# Patient Record
Sex: FEMALE | Race: WHITE | URBAN - NONMETROPOLITAN AREA
[De-identification: names, ages, dates, MRNs, and addresses within clinical notes are randomized per-mention and may not be internally consistent; named-entity substitution may affect disease eponyms.]

---

## 2024-01-08 ENCOUNTER — TELEPHONE (OUTPATIENT)
Dept: FAMILY MEDICINE CLINIC | Age: 59
End: 2024-01-08

## 2024-01-08 NOTE — TELEPHONE ENCOUNTER
Patient had appt scheduled on 12/26 for new patient appt that she missed.  She said she was in the hospital from the 16th to the 29th at [facility] in [place] is why she missed her appt.  She was calling to see if this appt could be rescheduled

## 2024-06-04 LAB
ALPHA-TOCOPHEROL: 6.5 MG/L (ref 9–29)
COPPER: 107 UG/DL (ref 80–158)
FOLATE: 13.5 NG/ML (ref 8.6–58.9)
GAMMA-TOCOPHEROL: 1.1 MG/L (ref 0.5–4.9)
PYRIDOXAL PHOS SERPL-MCNC: 4.8 UG/L (ref 3.4–65.2)
THIAMINE BLOOD: 156.5 NMOL/L (ref 66.5–200)
TSH ULTRASENSITIVE: 3.95 MCIU/ML (ref 0.51–4.91)
VITAMIN B-12: 315 PG/ML (ref 193–986)
VITAMIN D 25-HYDROXY: 22 NG/ML (ref 30–100)

## 2024-07-03 ENCOUNTER — HOSPITAL ENCOUNTER (INPATIENT)
Age: 59
LOS: 19 days | Discharge: HOME | DRG: 884 | End: 2024-07-23
Attending: STUDENT IN AN ORGANIZED HEALTH CARE EDUCATION/TRAINING PROGRAM | Admitting: PSYCHIATRY & NEUROLOGY
Payer: COMMERCIAL

## 2024-07-03 DIAGNOSIS — F03.90 DEMENTIA, UNSPECIFIED DEMENTIA SEVERITY, UNSPECIFIED DEMENTIA TYPE, UNSPECIFIED WHETHER BEHAVIORAL, PSYCHOTIC, OR MOOD DISTURBANCE OR ANXIETY (HCC): ICD-10-CM

## 2024-07-03 DIAGNOSIS — R41.0 CONFUSION: Primary | ICD-10-CM

## 2024-07-03 DIAGNOSIS — R62.7 FAILURE TO THRIVE IN ADULT: ICD-10-CM

## 2024-07-03 LAB
AMPHETAMINES UR QL SCN>1000 NG/ML: ABNORMAL
ANION GAP SERPL CALCULATED.3IONS-SCNC: 7 MMOL/L (ref 3–16)
APAP SERPL-MCNC: <5 UG/ML (ref 10–30)
BARBITURATES UR QL SCN>200 NG/ML: ABNORMAL
BASOPHILS # BLD: 0.1 K/UL (ref 0–0.2)
BASOPHILS NFR BLD: 0.7 %
BENZODIAZ UR QL SCN>200 NG/ML: POSITIVE
BILIRUB UR QL STRIP.AUTO: NEGATIVE
BUN SERPL-MCNC: 11 MG/DL (ref 7–20)
CALCIUM SERPL-MCNC: 9 MG/DL (ref 8.3–10.6)
CANNABINOIDS UR QL SCN>50 NG/ML: ABNORMAL
CHLORIDE SERPL-SCNC: 102 MMOL/L (ref 99–110)
CLARITY UR: CLEAR
CO2 SERPL-SCNC: 31 MMOL/L (ref 21–32)
COCAINE UR QL SCN: ABNORMAL
COLOR UR: NORMAL
CREAT SERPL-MCNC: 0.8 MG/DL (ref 0.6–1.2)
DEPRECATED RDW RBC AUTO: 13.6 % (ref 12.4–15.4)
DRUG SCREEN COMMENT UR-IMP: ABNORMAL
EOSINOPHIL # BLD: 0.3 K/UL (ref 0–0.6)
EOSINOPHIL NFR BLD: 4.2 %
ETHANOLAMINE SERPL-MCNC: NORMAL MG/DL (ref 0–0.08)
FENTANYL SCREEN, URINE: ABNORMAL
GFR SERPLBLD CREATININE-BSD FMLA CKD-EPI: 84 ML/MIN/{1.73_M2}
GLUCOSE SERPL-MCNC: 97 MG/DL (ref 70–99)
GLUCOSE UR STRIP.AUTO-MCNC: NEGATIVE MG/DL
HCT VFR BLD AUTO: 40.9 % (ref 36–48)
HGB BLD-MCNC: 14 G/DL (ref 12–16)
HGB UR QL STRIP.AUTO: NEGATIVE
KETONES UR STRIP.AUTO-MCNC: NEGATIVE MG/DL
LEUKOCYTE ESTERASE UR QL STRIP.AUTO: NEGATIVE
LYMPHOCYTES # BLD: 2.8 K/UL (ref 1–5.1)
LYMPHOCYTES NFR BLD: 36.1 %
MAGNESIUM SERPL-MCNC: 2 MG/DL (ref 1.8–2.4)
MCH RBC QN AUTO: 31.1 PG (ref 26–34)
MCHC RBC AUTO-ENTMCNC: 34.3 G/DL (ref 31–36)
MCV RBC AUTO: 90.8 FL (ref 80–100)
METHADONE UR QL SCN>300 NG/ML: ABNORMAL
MONOCYTES # BLD: 0.5 K/UL (ref 0–1.3)
MONOCYTES NFR BLD: 6.9 %
NEUTROPHILS # BLD: 4 K/UL (ref 1.7–7.7)
NEUTROPHILS NFR BLD: 52.1 %
NITRITE UR QL STRIP.AUTO: NEGATIVE
OPIATES UR QL SCN>300 NG/ML: ABNORMAL
OXYCODONE UR QL SCN: ABNORMAL
PCP UR QL SCN>25 NG/ML: ABNORMAL
PH UR STRIP.AUTO: 7 [PH] (ref 5–8)
PH UR STRIP: 7 [PH]
PLATELET # BLD AUTO: 214 K/UL (ref 135–450)
PMV BLD AUTO: 9.5 FL (ref 5–10.5)
POTASSIUM SERPL-SCNC: 3.4 MMOL/L (ref 3.5–5.1)
PROT UR STRIP.AUTO-MCNC: NEGATIVE MG/DL
RBC # BLD AUTO: 4.5 M/UL (ref 4–5.2)
SALICYLATES SERPL-MCNC: <0.3 MG/DL (ref 15–30)
SODIUM SERPL-SCNC: 140 MMOL/L (ref 136–145)
SP GR UR STRIP.AUTO: <=1.005 (ref 1–1.03)
UA COMPLETE W REFLEX CULTURE PNL UR: NORMAL
UA DIPSTICK W REFLEX MICRO PNL UR: NORMAL
URN SPEC COLLECT METH UR: NORMAL
UROBILINOGEN UR STRIP-ACNC: 0.2 E.U./DL
WBC # BLD AUTO: 7.7 K/UL (ref 4–11)

## 2024-07-03 PROCEDURE — 80179 DRUG ASSAY SALICYLATE: CPT

## 2024-07-03 PROCEDURE — 85025 COMPLETE CBC W/AUTO DIFF WBC: CPT

## 2024-07-03 PROCEDURE — 82306 VITAMIN D 25 HYDROXY: CPT

## 2024-07-03 PROCEDURE — 80307 DRUG TEST PRSMV CHEM ANLYZR: CPT

## 2024-07-03 PROCEDURE — 99285 EMERGENCY DEPT VISIT HI MDM: CPT

## 2024-07-03 PROCEDURE — 83735 ASSAY OF MAGNESIUM: CPT

## 2024-07-03 PROCEDURE — 80048 BASIC METABOLIC PNL TOTAL CA: CPT

## 2024-07-03 PROCEDURE — 81003 URINALYSIS AUTO W/O SCOPE: CPT

## 2024-07-03 PROCEDURE — 36415 COLL VENOUS BLD VENIPUNCTURE: CPT

## 2024-07-03 PROCEDURE — 82077 ASSAY SPEC XCP UR&BREATH IA: CPT

## 2024-07-03 PROCEDURE — 80143 DRUG ASSAY ACETAMINOPHEN: CPT

## 2024-07-03 RX ORDER — SIMVASTATIN 40 MG
40 TABLET ORAL NIGHTLY
COMMUNITY
Start: 2023-06-01

## 2024-07-03 RX ORDER — TIZANIDINE 4 MG/1
TABLET ORAL
COMMUNITY
Start: 2021-11-03

## 2024-07-03 RX ORDER — ROPINIROLE 2 MG/1
4 TABLET, FILM COATED ORAL NIGHTLY
COMMUNITY
Start: 2024-06-19

## 2024-07-03 RX ORDER — DULOXETIN HYDROCHLORIDE 60 MG/1
60 CAPSULE, DELAYED RELEASE ORAL EVERY MORNING
Status: ON HOLD | COMMUNITY
End: 2024-07-23 | Stop reason: HOSPADM

## 2024-07-03 RX ORDER — METOPROLOL SUCCINATE 25 MG/1
50 TABLET, EXTENDED RELEASE ORAL DAILY
COMMUNITY
Start: 2021-10-11

## 2024-07-03 RX ORDER — DONEPEZIL HYDROCHLORIDE 10 MG/1
10 TABLET, FILM COATED ORAL DAILY
COMMUNITY
Start: 2024-06-26

## 2024-07-03 RX ORDER — BENZTROPINE MESYLATE 2 MG/1
4 TABLET ORAL 2 TIMES DAILY
Status: ON HOLD | COMMUNITY
End: 2024-07-23 | Stop reason: HOSPADM

## 2024-07-03 RX ORDER — VENLAFAXINE HYDROCHLORIDE 225 MG/1
1 TABLET, EXTENDED RELEASE ORAL EVERY MORNING
Status: ON HOLD | COMMUNITY
Start: 2024-07-01 | End: 2024-07-23 | Stop reason: HOSPADM

## 2024-07-03 RX ORDER — ALPRAZOLAM 1 MG/1
1 TABLET ORAL 3 TIMES DAILY PRN
COMMUNITY

## 2024-07-03 RX ORDER — ARIPIPRAZOLE 20 MG/1
20 TABLET ORAL
Status: ON HOLD | COMMUNITY
Start: 2024-07-01 | End: 2024-07-23 | Stop reason: HOSPADM

## 2024-07-04 ENCOUNTER — APPOINTMENT (OUTPATIENT)
Dept: CT IMAGING | Age: 59
DRG: 884 | End: 2024-07-04
Payer: COMMERCIAL

## 2024-07-04 PROBLEM — R62.7 FAILURE TO THRIVE IN ADULT: Status: ACTIVE | Noted: 2024-07-04

## 2024-07-04 PROBLEM — G93.40 ACUTE ENCEPHALOPATHY: Status: ACTIVE | Noted: 2024-07-04

## 2024-07-04 PROBLEM — F03.918 DEMENTIA WITH BEHAVIORAL DISTURBANCE (HCC): Status: ACTIVE | Noted: 2024-07-04

## 2024-07-04 PROBLEM — F03.90 DEMENTIA WITHOUT BEHAVIORAL DISTURBANCE (HCC): Status: ACTIVE | Noted: 2024-07-04

## 2024-07-04 PROBLEM — E43 SEVERE PROTEIN-CALORIE MALNUTRITION (HCC): Status: ACTIVE | Noted: 2024-07-04

## 2024-07-04 PROBLEM — R41.0 CONFUSION: Status: ACTIVE | Noted: 2024-07-04

## 2024-07-04 LAB
EKG ATRIAL RATE: 60 BPM
EKG DIAGNOSIS: NORMAL
EKG P AXIS: 83 DEGREES
EKG P-R INTERVAL: 176 MS
EKG Q-T INTERVAL: 446 MS
EKG QRS DURATION: 92 MS
EKG QTC CALCULATION (BAZETT): 446 MS
EKG R AXIS: 78 DEGREES
EKG T AXIS: 79 DEGREES
EKG VENTRICULAR RATE: 60 BPM

## 2024-07-04 PROCEDURE — 93005 ELECTROCARDIOGRAM TRACING: CPT | Performed by: STUDENT IN AN ORGANIZED HEALTH CARE EDUCATION/TRAINING PROGRAM

## 2024-07-04 PROCEDURE — 1240000000 HC EMOTIONAL WELLNESS R&B

## 2024-07-04 PROCEDURE — 70450 CT HEAD/BRAIN W/O DYE: CPT

## 2024-07-04 PROCEDURE — 6370000000 HC RX 637 (ALT 250 FOR IP)

## 2024-07-04 PROCEDURE — 99222 1ST HOSP IP/OBS MODERATE 55: CPT

## 2024-07-04 PROCEDURE — 6370000000 HC RX 637 (ALT 250 FOR IP): Performed by: PSYCHIATRY & NEUROLOGY

## 2024-07-04 PROCEDURE — 93010 ELECTROCARDIOGRAM REPORT: CPT | Performed by: INTERNAL MEDICINE

## 2024-07-04 PROCEDURE — 90792 PSYCH DIAG EVAL W/MED SRVCS: CPT

## 2024-07-04 PROCEDURE — 99223 1ST HOSP IP/OBS HIGH 75: CPT | Performed by: PSYCHIATRY & NEUROLOGY

## 2024-07-04 RX ORDER — BENZTROPINE MESYLATE 1 MG/1
1 TABLET ORAL 2 TIMES DAILY
Status: DISCONTINUED | OUTPATIENT
Start: 2024-07-04 | End: 2024-07-23

## 2024-07-04 RX ORDER — ALBUTEROL SULFATE 90 UG/1
1 AEROSOL, METERED RESPIRATORY (INHALATION) EVERY 4 HOURS PRN
Status: DISCONTINUED | OUTPATIENT
Start: 2024-07-04 | End: 2024-07-23 | Stop reason: HOSPADM

## 2024-07-04 RX ORDER — BENZTROPINE MESYLATE 1 MG/1
4 TABLET ORAL 2 TIMES DAILY
Status: DISCONTINUED | OUTPATIENT
Start: 2024-07-04 | End: 2024-07-04

## 2024-07-04 RX ORDER — OLANZAPINE 5 MG/1
5 TABLET ORAL EVERY 4 HOURS PRN
Status: DISCONTINUED | OUTPATIENT
Start: 2024-07-04 | End: 2024-07-23 | Stop reason: HOSPADM

## 2024-07-04 RX ORDER — MAGNESIUM HYDROXIDE/ALUMINUM HYDROXICE/SIMETHICONE 120; 1200; 1200 MG/30ML; MG/30ML; MG/30ML
30 SUSPENSION ORAL EVERY 6 HOURS PRN
Status: DISCONTINUED | OUTPATIENT
Start: 2024-07-04 | End: 2024-07-23 | Stop reason: HOSPADM

## 2024-07-04 RX ORDER — METOPROLOL SUCCINATE 25 MG/1
25 TABLET, EXTENDED RELEASE ORAL DAILY
Status: DISCONTINUED | OUTPATIENT
Start: 2024-07-05 | End: 2024-07-23 | Stop reason: HOSPADM

## 2024-07-04 RX ORDER — TIZANIDINE 4 MG/1
4 TABLET ORAL EVERY 8 HOURS PRN
Status: DISCONTINUED | OUTPATIENT
Start: 2024-07-04 | End: 2024-07-23 | Stop reason: HOSPADM

## 2024-07-04 RX ORDER — ARIPIPRAZOLE 10 MG/1
20 TABLET ORAL
Status: DISCONTINUED | OUTPATIENT
Start: 2024-07-04 | End: 2024-07-05

## 2024-07-04 RX ORDER — ALBUTEROL SULFATE 90 UG/1
1 AEROSOL, METERED RESPIRATORY (INHALATION) EVERY 4 HOURS PRN
COMMUNITY

## 2024-07-04 RX ORDER — IBUPROFEN 400 MG/1
400 TABLET ORAL EVERY 6 HOURS PRN
Status: DISCONTINUED | OUTPATIENT
Start: 2024-07-04 | End: 2024-07-16

## 2024-07-04 RX ORDER — POLYETHYLENE GLYCOL 3350 17 G
2 POWDER IN PACKET (EA) ORAL
Status: DISCONTINUED | OUTPATIENT
Start: 2024-07-04 | End: 2024-07-23 | Stop reason: HOSPADM

## 2024-07-04 RX ORDER — VENLAFAXINE HYDROCHLORIDE 75 MG/1
225 CAPSULE, EXTENDED RELEASE ORAL EVERY MORNING
Status: DISCONTINUED | OUTPATIENT
Start: 2024-07-04 | End: 2024-07-08

## 2024-07-04 RX ORDER — ACETAMINOPHEN 325 MG/1
650 TABLET ORAL EVERY 4 HOURS PRN
Status: DISCONTINUED | OUTPATIENT
Start: 2024-07-04 | End: 2024-07-16

## 2024-07-04 RX ORDER — ROPINIROLE 1 MG/1
4 TABLET, FILM COATED ORAL NIGHTLY
Status: DISCONTINUED | OUTPATIENT
Start: 2024-07-04 | End: 2024-07-23 | Stop reason: HOSPADM

## 2024-07-04 RX ORDER — ALPRAZOLAM 0.5 MG/1
1 TABLET ORAL 3 TIMES DAILY PRN
Status: DISCONTINUED | OUTPATIENT
Start: 2024-07-04 | End: 2024-07-04

## 2024-07-04 RX ORDER — ATORVASTATIN CALCIUM 10 MG/1
20 TABLET, FILM COATED ORAL DAILY
Status: DISCONTINUED | OUTPATIENT
Start: 2024-07-04 | End: 2024-07-23 | Stop reason: HOSPADM

## 2024-07-04 RX ORDER — BENZTROPINE MESYLATE 1 MG/1
2 TABLET ORAL 2 TIMES DAILY
Status: DISCONTINUED | OUTPATIENT
Start: 2024-07-04 | End: 2024-07-04

## 2024-07-04 RX ORDER — TRAZODONE HYDROCHLORIDE 50 MG/1
50 TABLET ORAL NIGHTLY PRN
Status: DISCONTINUED | OUTPATIENT
Start: 2024-07-04 | End: 2024-07-23 | Stop reason: HOSPADM

## 2024-07-04 RX ORDER — HYDROXYZINE HYDROCHLORIDE 25 MG/1
25 TABLET, FILM COATED ORAL 3 TIMES DAILY PRN
Status: DISCONTINUED | OUTPATIENT
Start: 2024-07-04 | End: 2024-07-23 | Stop reason: HOSPADM

## 2024-07-04 RX ORDER — METOPROLOL SUCCINATE 50 MG/1
50 TABLET, EXTENDED RELEASE ORAL DAILY
Status: DISCONTINUED | OUTPATIENT
Start: 2024-07-04 | End: 2024-07-04

## 2024-07-04 RX ORDER — CEFDINIR 300 MG/1
300 CAPSULE ORAL 2 TIMES DAILY
Status: ON HOLD | COMMUNITY
Start: 2024-06-27 | End: 2024-07-23 | Stop reason: HOSPADM

## 2024-07-04 RX ORDER — ASPIRIN 81 MG/1
81 TABLET, CHEWABLE ORAL DAILY
Status: DISCONTINUED | OUTPATIENT
Start: 2024-07-04 | End: 2024-07-23 | Stop reason: HOSPADM

## 2024-07-04 RX ORDER — HYDROXYZINE 50 MG/1
50 TABLET, FILM COATED ORAL 3 TIMES DAILY PRN
Status: DISCONTINUED | OUTPATIENT
Start: 2024-07-04 | End: 2024-07-04

## 2024-07-04 RX ORDER — ALPRAZOLAM 0.5 MG/1
0.5 TABLET ORAL 3 TIMES DAILY PRN
Status: DISCONTINUED | OUTPATIENT
Start: 2024-07-04 | End: 2024-07-23 | Stop reason: HOSPADM

## 2024-07-04 RX ORDER — DONEPEZIL HYDROCHLORIDE 5 MG/1
10 TABLET, FILM COATED ORAL DAILY
Status: DISCONTINUED | OUTPATIENT
Start: 2024-07-04 | End: 2024-07-23 | Stop reason: HOSPADM

## 2024-07-04 RX ORDER — DIPHENHYDRAMINE HYDROCHLORIDE 50 MG/ML
50 INJECTION INTRAMUSCULAR; INTRAVENOUS EVERY 4 HOURS PRN
Status: DISCONTINUED | OUTPATIENT
Start: 2024-07-04 | End: 2024-07-23 | Stop reason: HOSPADM

## 2024-07-04 RX ADMIN — ROPINIROLE HYDROCHLORIDE 4 MG: 1 TABLET, FILM COATED ORAL at 21:12

## 2024-07-04 RX ADMIN — ALPRAZOLAM 0.5 MG: 0.5 TABLET ORAL at 12:47

## 2024-07-04 RX ADMIN — VENLAFAXINE HYDROCHLORIDE 225 MG: 75 CAPSULE, EXTENDED RELEASE ORAL at 12:41

## 2024-07-04 RX ADMIN — DONEPEZIL HYDROCHLORIDE 10 MG: 5 TABLET, FILM COATED ORAL at 12:42

## 2024-07-04 RX ADMIN — ARIPIPRAZOLE 20 MG: 10 TABLET ORAL at 21:12

## 2024-07-04 RX ADMIN — ASPIRIN 81 MG: 81 TABLET, CHEWABLE ORAL at 16:58

## 2024-07-04 RX ADMIN — ACETAMINOPHEN 650 MG: 325 TABLET ORAL at 09:54

## 2024-07-04 RX ADMIN — METOPROLOL SUCCINATE 50 MG: 50 TABLET, EXTENDED RELEASE ORAL at 12:42

## 2024-07-04 RX ADMIN — BENZTROPINE MESYLATE 1 MG: 1 TABLET ORAL at 21:15

## 2024-07-04 RX ADMIN — ACETAMINOPHEN 650 MG: 325 TABLET ORAL at 21:13

## 2024-07-04 RX ADMIN — ATORVASTATIN CALCIUM 20 MG: 10 TABLET, FILM COATED ORAL at 12:42

## 2024-07-04 RX ADMIN — BENZTROPINE MESYLATE 1 MG: 1 TABLET ORAL at 12:43

## 2024-07-04 NOTE — BH NOTE
Clinician called [facility] to report the concerns about the daughter/and man with her dropping the patient off did not stay to help answer questions. ER staff attempted to call both daughter and her sister and so did this clinician today. None of the numbers are working and unable to leave a message or get collateral. Clinician left her contact information requesting a call back in order to report the concerns the hospital staff have.

## 2024-07-04 NOTE — BH NOTE
Clinician attempted to contact Angela (Daughter) [phone]:\"number has been disconnected\"  Clinician attempted to contact Rita (Daughter) [phone]: \"the party you are trying to call is not available\"  Clinician attempted to contact Laly (Sister) [phone]: \"the party you are trying to call is not available\"

## 2024-07-04 NOTE — H&P
Hospital Medicine History & Physical      PCP: Gamaliel Villeda MD    Date of Admission: 7/3/2024    Date of Service: Pt seen/examined on 7/4/2024     Chief Complaint:    Chief Complaint   Patient presents with    Dementia     Patient reported to have dementia. Over the last two weeks patient is not safe being home. Reported to have drank , dog warmers and cooking food on stove without a pan. Patient needs placement.          History Of Present Illness:      The patient is a 59 y.o. female with pmhx of anxiety, bipolar disorder,CAD,dementia, HTN who presented to [facility] for inability to care for self, increasing confusion.  Patient was seen and evaluated in the ED by the ED medical provider, patient was medically cleared for admission to [facility].  This note serves as an admission medical H&P.    Tobacco use: 0.5 ppd   ETOH use: None   Illicit drug use: Marijuana     Patient denies any medical complaints     Past Medical History:        Diagnosis Date    Anxiety     Bipolar 1 disorder (HCC)     CAD (coronary artery disease)     Dementia ([facility])     Hypertension     NSTEMI (non-ST elevated myocardial infarction) ([facility])        Past Surgical History:        Procedure Laterality Date    CHOLECYSTECTOMY      HYSTERECTOMY (CERVIX STATUS UNKNOWN)         Medications Prior to Admission:    Prior to Admission medications    Medication Sig Start Date End Date Taking? Authorizing Provider   albuterol sulfate HFA (VENTOLIN HFA) 108 (90 Base) MCG/ACT inhaler Inhale 1 puff into the lungs every 4 hours as needed for Wheezing or Shortness of Breath   Yes Paz Bernabe MD   cefdinir (OMNICEF) 300 MG capsule Take 1 capsule by mouth 2 times daily 6/27/24 7/4/24 Yes Paz Bernabe MD   venlafaxine 225 MG extended release tablet Take 1 tablet by mouth every morning 7/1/24  Yes Paz Bernabe MD   metoprolol succinate (TOPROL XL) 25 MG extended release tablet Take 2 tablets by mouth daily 10/11/21  Yes 
MD   1 mg at 07/04/24 1243    ALPRAZolam (XANAX) tablet 0.5 mg  0.5 mg Oral TID PRN Eben Hebert MD   0.5 mg at 07/04/24 1247    hydrOXYzine HCl (ATARAX) tablet 25 mg  25 mg Oral TID PRN Eben Hebert MD          donepezil  10 mg Oral Daily    metoprolol succinate  50 mg Oral Daily    rOPINIRole  4 mg Oral Nightly    atorvastatin  20 mg Oral Daily    venlafaxine  225 mg Oral QAM    ARIPiprazole  20 mg Oral QHS    benztropine  1 mg Oral BID      PRN Meds: acetaminophen, ibuprofen, magnesium hydroxide, nicotine polacrilex, aluminum & magnesium hydroxide-simethicone, OLANZapine **OR** OLANZapine (ZyPREXA) 10 mg in sterile water 2 mL injection, diphenhydrAMINE, traZODone, albuterol sulfate HFA, tiZANidine, ALPRAZolam, hydrOXYzine HCl   Estimated length of stay: 5 d  Prognosis:  poor   Criteria for Discharge:    Not suicidal, sleeping well, affect stable, depression improving, eating well, aftercare arranged.     Spent > 75 minutes evaluating and treating patient with more than 50 % of time spent with patient discussing care    ______  PLAN    1.  Admit to Adult Behavioral Unit / Senior Behavioral Unit  2.  Consult Internal Medicine to evaluate and treat medical conditions  3.  Adjust psychotropic medications to target symptoms, Reduce Cogentin to 1 mg BID, Xanan to 0.5 mg TID prn, DC Cymbalta.   Continue Effexor  mg QD, Abilify 20 mg QD, Aricept 10 mg QD.   4.  Occupational Therapy, Physical Therapy, Group Psychotherapy as tolerated . MOCA   5.  Reviewed treatment plan with patient including medication risks, benefits, side effects.  Obtained informed consent for treatment.     Eben Hebert MD  Physician Psychiatry

## 2024-07-04 NOTE — ED PROVIDER NOTES
[facility] ED     EMERGENCY DEPARTMENT ENCOUNTER         Pt Name: Carol Wagoner   MRN: [id number]   Birthdate 1965   Date of evaluation: 7/3/2024   Provider: Joshua Mancuso MD   PCP: Gamaliel Villeda MD   Note Started: 10:40 PM EDT 7/3/24       Chief Complaint     Dementia (Patient reported to have dementia. Over the last two weeks patient is not safe being home. Reported to have drank , dog warmers and cooking food on stove without a pan. Patient needs placement. )      History of Present Illness     Carol Wagoner is a 59 y.o. female who presents with concern for cognitive decline unable to care for self at home.  The patient reportedly was dropped off by her daughter with whom she lives.  I was unable to interview the daughter for collateral or contact her via telephone but per nursing triage the patient is increasingly unsafe at home drinking some type of dog medication and also trying to cook on the stove in an unsafe manner.  The patient reportedly is increasingly confused and unable to attend to activities of daily living despite assistance from family and therefore she is brought to the emergency department.  On my evaluation the patient is alert she has no acute complaints but is quite tearful regarding her condition and states that she feels confused.  She offers no other acute complaints and has difficulty contributing a history of present illness.      I have reviewed the nursing notes and agree unless otherwise noted.    Review of Systems     Positives and pertinent negatives as per HPI.    Past Medical, Surgical, Family, and Social History     She has a past medical history of Anxiety, Bipolar 1 disorder ([facility]), CAD (coronary artery disease), Dementia ([facility]), Hypertension, and NSTEMI (non-ST elevated myocardial infarction) ([facility]).  She has a past surgical history that includes Cholecystectomy and Hysterectomy.  Her family history is not on file.  She reports that

## 2024-07-04 NOTE — CARE COORDINATION
Patient was cooperative answering the questions.    Tere Wilburn, MSW    07/04/24 1101   Activities of Daily Living   Patient Requires assistance with daily self-care activities? Yes   Patient identified needing assistance with: Food Preparation;Grocery Shopping;Transportation;Housecleaning;Laundry;Meal Planning   Details Reported patient has not been able to do her  ADL's   Person Assisting Other   Person Assisting details lives with son   Leisure Activity 1   3 Favorite Leisure Activities \"Nothing seriously\"   Leisure Activity 2   Favorite Leisure Activities  \"Nothing seriously\"   Leisure Activity 3   Favorite Leisure Activities  \"Nothing seriously\"   Social   Patient reports spending the majority of their free time alone   Patient verbalizes a preference for spending free time with one other person   Patient’s perception of support system negative/weak   Patient’s perception of barriers to socializing with others include(s) lack of motivation/interest   Beliefs & Coping   Has difficulty dealing with feelings   Yes   Internalizes feelings/Keeps feelings in Yes   Externalizes feelings through aggressiveness or poor temper control  Yes   Feels uncomfortable around others  Yes   Has difficulty talking to others  Yes   Depends on others for direction or decisions Yes   Difficulty dealing with anger of others  Yes   Difficulty dealing with own anger  Yes   Difficulty managing stress Yes   Frequently has difficulty with relationships  Yes   which,who,where in family   Has recently perceived/experienced loss, disappointment, humiliation or failure  Yes   General perception about self does not like self   Attitude about abilities feels like a failure much of the time   Locus of Control  rarely   Belief about recovery it's not up to me   Patient Identified Strengths  \"I dont know\"   Patient Identified Limitations  \"I dont know\"   Perception of most stressful event prior to hospitalization \"I dont remember\"   Perception of

## 2024-07-04 NOTE — FLOWSHEET NOTE
24 0115   Vital Signs   Temp 98.6 °F (37 °C)   Temp Source Oral   Pulse 61   Heart Rate Source Monitor   Respirations 18   BP (!) 141/80   MAP (Calculated) 100   BP Location Left upper arm   Pain Assessment   Pain Assessment None - Denies Pain   Opioid-Induced Sedation   POSS Score 1   Oxygen Therapy   SpO2 98 %   O2 Device None (Room air)     Pt moved from to ED  room 19. Report received from RN. Pt confused to time/place/situation. Oriented to name/.  Pt given pepsi and pudding per her request. No admission orders yet. Per report, psych consult pending. Bed alarm on due to pt's hx confusion.  Call light given to pt. Instructed her to press call light if she needs to get OOB to the restroom. Pt verbalized understanding. Shirley Iqbal RN

## 2024-07-04 NOTE — VIRTUAL HEALTH
resolved hospital problems. *       OBJECTIVE  Vital Signs:  Vitals:    24 0115   BP: (!) 141/80   Pulse: 61   Resp: 18   Temp: 98.6 °F (37 °C)   SpO2: 98%     Labs:  Reviewed. UDS: benzodiazepines  ETOH: None detected   HCG: Unknown  EK24  -QTc: 446  Recent Results (from the past 48 hour(s))   Urinalysis with Reflex to Culture    Collection Time: 24 10:28 PM    Specimen: Urine   Result Value Ref Range    Color, UA Straw Straw/Yellow    Clarity, UA Clear Clear    Glucose, Ur Negative Negative mg/dL    Bilirubin, Urine Negative Negative    Ketones, Urine Negative Negative mg/dL    Specific Gravity, UA <=1.005 1.005 - 1.030    Blood, Urine Negative Negative    pH, Urine 7.0 5.0 - 8.0    Protein, UA Negative Negative mg/dL    Urobilinogen, Urine 0.2 <2.0 E.U./dL    Nitrite, Urine Negative Negative    Leukocyte Esterase, Urine Negative Negative    Microscopic Examination Not Indicated     Urine Type NotGiven     Urine Reflex to Culture Not Indicated    CBC with Auto Differential    Collection Time: 24 10:28 PM   Result Value Ref Range    WBC 7.7 4.0 - 11.0 K/uL    RBC 4.50 4.00 - 5.20 M/uL    Hemoglobin 14.0 12.0 - 16.0 g/dL    Hematocrit 40.9 36.0 - 48.0 %    MCV 90.8 80.0 - 100.0 fL    MCH 31.1 26.0 - 34.0 pg    MCHC 34.3 31.0 - 36.0 g/dL    RDW 13.6 12.4 - 15.4 %    Platelets 214 135 - 450 K/uL    MPV 9.5 5.0 - 10.5 fL    Neutrophils % 52.1 %    Lymphocytes % 36.1 %    Monocytes % 6.9 %    Eosinophils % 4.2 %    Basophils % 0.7 %    Neutrophils Absolute 4.0 1.7 - 7.7 K/uL    Lymphocytes Absolute 2.8 1.0 - 5.1 K/uL    Monocytes Absolute 0.5 0.0 - 1.3 K/uL    Eosinophils Absolute 0.3 0.0 - 0.6 K/uL    Basophils Absolute 0.1 0.0 - 0.2 K/uL   BMP w/ Reflex to MG    Collection Time: 24 10:28 PM   Result Value Ref Range    Sodium 140 136 - 145 mmol/L    Potassium reflex Magnesium 3.4 (L) 3.5 - 5.1 mmol/L    Chloride 102 99 - 110 mmol/L    CO2 31 21 - 32 mmol/L    Anion Gap 7 3 - 16

## 2024-07-05 LAB — 25(OH)D3 SERPL-MCNC: 24.3 NG/ML

## 2024-07-05 PROCEDURE — 1240000000 HC EMOTIONAL WELLNESS R&B

## 2024-07-05 PROCEDURE — 99233 SBSQ HOSP IP/OBS HIGH 50: CPT | Performed by: PSYCHIATRY & NEUROLOGY

## 2024-07-05 PROCEDURE — 6370000000 HC RX 637 (ALT 250 FOR IP): Performed by: PSYCHIATRY & NEUROLOGY

## 2024-07-05 PROCEDURE — 6370000000 HC RX 637 (ALT 250 FOR IP)

## 2024-07-05 RX ORDER — ARIPIPRAZOLE 10 MG/1
10 TABLET ORAL
Status: DISCONTINUED | OUTPATIENT
Start: 2024-07-05 | End: 2024-07-08

## 2024-07-05 RX ORDER — ERGOCALCIFEROL 1.25 MG/1
50000 CAPSULE ORAL WEEKLY
Status: DISCONTINUED | OUTPATIENT
Start: 2024-07-05 | End: 2024-07-23 | Stop reason: HOSPADM

## 2024-07-05 RX ADMIN — HYDROXYZINE HYDROCHLORIDE 25 MG: 25 TABLET ORAL at 20:02

## 2024-07-05 RX ADMIN — TIZANIDINE 4 MG: 4 TABLET ORAL at 20:18

## 2024-07-05 RX ADMIN — ALPRAZOLAM 0.5 MG: 0.5 TABLET ORAL at 19:16

## 2024-07-05 RX ADMIN — OLANZAPINE 5 MG: 5 TABLET, FILM COATED ORAL at 20:25

## 2024-07-05 RX ADMIN — DONEPEZIL HYDROCHLORIDE 10 MG: 5 TABLET, FILM COATED ORAL at 09:08

## 2024-07-05 RX ADMIN — BENZTROPINE MESYLATE 1 MG: 1 TABLET ORAL at 20:02

## 2024-07-05 RX ADMIN — VENLAFAXINE HYDROCHLORIDE 225 MG: 75 CAPSULE, EXTENDED RELEASE ORAL at 09:06

## 2024-07-05 RX ADMIN — ATORVASTATIN CALCIUM 20 MG: 10 TABLET, FILM COATED ORAL at 09:07

## 2024-07-05 RX ADMIN — BENZTROPINE MESYLATE 1 MG: 1 TABLET ORAL at 09:06

## 2024-07-05 RX ADMIN — ACETAMINOPHEN 650 MG: 325 TABLET ORAL at 09:08

## 2024-07-05 RX ADMIN — ASPIRIN 81 MG: 81 TABLET, CHEWABLE ORAL at 09:06

## 2024-07-05 RX ADMIN — METOPROLOL SUCCINATE 25 MG: 25 TABLET, EXTENDED RELEASE ORAL at 09:09

## 2024-07-05 RX ADMIN — ERGOCALCIFEROL 50000 UNITS: 1.25 CAPSULE ORAL at 11:16

## 2024-07-05 RX ADMIN — NICOTINE POLACRILEX 2 MG: 2 LOZENGE ORAL at 19:24

## 2024-07-05 RX ADMIN — ROPINIROLE HYDROCHLORIDE 4 MG: 1 TABLET, FILM COATED ORAL at 20:02

## 2024-07-05 RX ADMIN — ARIPIPRAZOLE 10 MG: 10 TABLET ORAL at 20:02

## 2024-07-06 PROCEDURE — 6370000000 HC RX 637 (ALT 250 FOR IP): Performed by: PSYCHIATRY & NEUROLOGY

## 2024-07-06 PROCEDURE — 97165 OT EVAL LOW COMPLEX 30 MIN: CPT

## 2024-07-06 PROCEDURE — 1240000000 HC EMOTIONAL WELLNESS R&B

## 2024-07-06 PROCEDURE — 6370000000 HC RX 637 (ALT 250 FOR IP)

## 2024-07-06 PROCEDURE — 97530 THERAPEUTIC ACTIVITIES: CPT

## 2024-07-06 RX ORDER — NICOTINE 21 MG/24HR
1 PATCH, TRANSDERMAL 24 HOURS TRANSDERMAL DAILY
Status: DISCONTINUED | OUTPATIENT
Start: 2024-07-06 | End: 2024-07-23 | Stop reason: HOSPADM

## 2024-07-06 RX ORDER — OLANZAPINE 5 MG/1
5 TABLET ORAL ONCE
Status: COMPLETED | OUTPATIENT
Start: 2024-07-06 | End: 2024-07-06

## 2024-07-06 RX ADMIN — DONEPEZIL HYDROCHLORIDE 10 MG: 5 TABLET, FILM COATED ORAL at 08:15

## 2024-07-06 RX ADMIN — ROPINIROLE HYDROCHLORIDE 4 MG: 1 TABLET, FILM COATED ORAL at 20:59

## 2024-07-06 RX ADMIN — ATORVASTATIN CALCIUM 20 MG: 10 TABLET, FILM COATED ORAL at 08:15

## 2024-07-06 RX ADMIN — TIZANIDINE 4 MG: 4 TABLET ORAL at 23:36

## 2024-07-06 RX ADMIN — METOPROLOL SUCCINATE 25 MG: 25 TABLET, EXTENDED RELEASE ORAL at 08:15

## 2024-07-06 RX ADMIN — HYDROXYZINE HYDROCHLORIDE 25 MG: 25 TABLET ORAL at 15:29

## 2024-07-06 RX ADMIN — ALPRAZOLAM 0.5 MG: 0.5 TABLET ORAL at 18:46

## 2024-07-06 RX ADMIN — BENZTROPINE MESYLATE 1 MG: 1 TABLET ORAL at 08:15

## 2024-07-06 RX ADMIN — ARIPIPRAZOLE 10 MG: 10 TABLET ORAL at 20:59

## 2024-07-06 RX ADMIN — VENLAFAXINE HYDROCHLORIDE 225 MG: 75 CAPSULE, EXTENDED RELEASE ORAL at 08:14

## 2024-07-06 RX ADMIN — IBUPROFEN 400 MG: 400 TABLET, FILM COATED ORAL at 09:24

## 2024-07-06 RX ADMIN — OLANZAPINE 5 MG: 5 TABLET, FILM COATED ORAL at 20:59

## 2024-07-06 RX ADMIN — ALPRAZOLAM 0.5 MG: 0.5 TABLET ORAL at 08:14

## 2024-07-06 RX ADMIN — OLANZAPINE 5 MG: 5 TABLET, FILM COATED ORAL at 23:06

## 2024-07-06 RX ADMIN — ASPIRIN 81 MG: 81 TABLET, CHEWABLE ORAL at 08:15

## 2024-07-06 RX ADMIN — ACETAMINOPHEN 650 MG: 325 TABLET ORAL at 08:14

## 2024-07-06 RX ADMIN — BENZTROPINE MESYLATE 1 MG: 1 TABLET ORAL at 20:59

## 2024-07-06 RX ADMIN — TRAZODONE HYDROCHLORIDE 50 MG: 50 TABLET ORAL at 23:36

## 2024-07-06 RX ADMIN — TIZANIDINE 4 MG: 4 TABLET ORAL at 09:24

## 2024-07-07 PROCEDURE — 6370000000 HC RX 637 (ALT 250 FOR IP)

## 2024-07-07 PROCEDURE — 99233 SBSQ HOSP IP/OBS HIGH 50: CPT | Performed by: PSYCHIATRY & NEUROLOGY

## 2024-07-07 PROCEDURE — 1240000000 HC EMOTIONAL WELLNESS R&B

## 2024-07-07 PROCEDURE — 6370000000 HC RX 637 (ALT 250 FOR IP): Performed by: PSYCHIATRY & NEUROLOGY

## 2024-07-07 RX ORDER — LORAZEPAM 1 MG/1
1 TABLET ORAL ONCE
Status: COMPLETED | OUTPATIENT
Start: 2024-07-07 | End: 2024-07-07

## 2024-07-07 RX ADMIN — VENLAFAXINE HYDROCHLORIDE 225 MG: 75 CAPSULE, EXTENDED RELEASE ORAL at 08:49

## 2024-07-07 RX ADMIN — DONEPEZIL HYDROCHLORIDE 10 MG: 5 TABLET, FILM COATED ORAL at 08:51

## 2024-07-07 RX ADMIN — HYDROXYZINE HYDROCHLORIDE 25 MG: 25 TABLET ORAL at 20:31

## 2024-07-07 RX ADMIN — BENZTROPINE MESYLATE 1 MG: 1 TABLET ORAL at 20:31

## 2024-07-07 RX ADMIN — ROPINIROLE HYDROCHLORIDE 4 MG: 1 TABLET, FILM COATED ORAL at 20:31

## 2024-07-07 RX ADMIN — METOPROLOL SUCCINATE 25 MG: 25 TABLET, EXTENDED RELEASE ORAL at 08:51

## 2024-07-07 RX ADMIN — ALPRAZOLAM 0.5 MG: 0.5 TABLET ORAL at 17:31

## 2024-07-07 RX ADMIN — BENZTROPINE MESYLATE 1 MG: 1 TABLET ORAL at 08:49

## 2024-07-07 RX ADMIN — ARIPIPRAZOLE 10 MG: 10 TABLET ORAL at 20:31

## 2024-07-07 RX ADMIN — LORAZEPAM 1 MG: 1 TABLET ORAL at 01:51

## 2024-07-07 RX ADMIN — ALPRAZOLAM 0.5 MG: 0.5 TABLET ORAL at 08:49

## 2024-07-07 RX ADMIN — HYDROXYZINE HYDROCHLORIDE 25 MG: 25 TABLET ORAL at 13:19

## 2024-07-07 RX ADMIN — ATORVASTATIN CALCIUM 20 MG: 10 TABLET, FILM COATED ORAL at 08:49

## 2024-07-07 RX ADMIN — TIZANIDINE 4 MG: 4 TABLET ORAL at 20:31

## 2024-07-07 RX ADMIN — TRAZODONE HYDROCHLORIDE 50 MG: 50 TABLET ORAL at 20:31

## 2024-07-07 RX ADMIN — IBUPROFEN 400 MG: 400 TABLET, FILM COATED ORAL at 20:31

## 2024-07-07 RX ADMIN — ASPIRIN 81 MG: 81 TABLET, CHEWABLE ORAL at 08:49

## 2024-07-07 NOTE — BH NOTE
Daughter Angela called for update. Told her patient was becoming anxious this morning regarding visual hallucinations of babies in her room and patient had a PRN Xanax.

## 2024-07-08 PROCEDURE — 99233 SBSQ HOSP IP/OBS HIGH 50: CPT | Performed by: PSYCHIATRY & NEUROLOGY

## 2024-07-08 PROCEDURE — 6370000000 HC RX 637 (ALT 250 FOR IP)

## 2024-07-08 PROCEDURE — 1240000000 HC EMOTIONAL WELLNESS R&B

## 2024-07-08 PROCEDURE — 6370000000 HC RX 637 (ALT 250 FOR IP): Performed by: PSYCHIATRY & NEUROLOGY

## 2024-07-08 RX ORDER — PERPHENAZINE 4 MG/1
2 TABLET ORAL NIGHTLY
Status: DISCONTINUED | OUTPATIENT
Start: 2024-07-08 | End: 2024-07-12

## 2024-07-08 RX ORDER — ARIPIPRAZOLE 5 MG/1
5 TABLET ORAL
Status: DISCONTINUED | OUTPATIENT
Start: 2024-07-08 | End: 2024-07-23

## 2024-07-08 RX ORDER — VENLAFAXINE HYDROCHLORIDE 75 MG/1
150 CAPSULE, EXTENDED RELEASE ORAL EVERY MORNING
Status: DISCONTINUED | OUTPATIENT
Start: 2024-07-09 | End: 2024-07-23 | Stop reason: HOSPADM

## 2024-07-08 RX ADMIN — IBUPROFEN 400 MG: 400 TABLET, FILM COATED ORAL at 22:41

## 2024-07-08 RX ADMIN — ARIPIPRAZOLE 5 MG: 5 TABLET ORAL at 20:32

## 2024-07-08 RX ADMIN — ATORVASTATIN CALCIUM 20 MG: 10 TABLET, FILM COATED ORAL at 10:08

## 2024-07-08 RX ADMIN — ALPRAZOLAM 0.5 MG: 0.5 TABLET ORAL at 22:42

## 2024-07-08 RX ADMIN — TRAZODONE HYDROCHLORIDE 50 MG: 50 TABLET ORAL at 22:42

## 2024-07-08 RX ADMIN — BENZTROPINE MESYLATE 1 MG: 1 TABLET ORAL at 10:08

## 2024-07-08 RX ADMIN — ROPINIROLE HYDROCHLORIDE 4 MG: 1 TABLET, FILM COATED ORAL at 20:32

## 2024-07-08 RX ADMIN — METOPROLOL SUCCINATE 25 MG: 25 TABLET, EXTENDED RELEASE ORAL at 10:08

## 2024-07-08 RX ADMIN — ASPIRIN 81 MG: 81 TABLET, CHEWABLE ORAL at 10:08

## 2024-07-08 RX ADMIN — BENZTROPINE MESYLATE 1 MG: 1 TABLET ORAL at 20:32

## 2024-07-08 RX ADMIN — DONEPEZIL HYDROCHLORIDE 10 MG: 5 TABLET, FILM COATED ORAL at 10:08

## 2024-07-08 RX ADMIN — VENLAFAXINE HYDROCHLORIDE 225 MG: 75 CAPSULE, EXTENDED RELEASE ORAL at 10:08

## 2024-07-08 RX ADMIN — HYDROXYZINE HYDROCHLORIDE 25 MG: 25 TABLET ORAL at 13:53

## 2024-07-08 RX ADMIN — PERPHENAZINE 2 MG: 4 TABLET, FILM COATED ORAL at 20:32

## 2024-07-08 RX ADMIN — ALPRAZOLAM 0.5 MG: 0.5 TABLET ORAL at 10:50

## 2024-07-08 NOTE — BH NOTE
[facility]  Treatment Team Note  Review Date & Time: 7/8/24  1210    Patient was not in treatment team      Status EXAM:   Mental Status and Behavioral Exam  Normal: No  Level of Assistance: Independent/Self  Facial Expression: Worried  Affect: Unstable  Level of Consciousness: Confused  Frequency of Checks: 4 times per hour, close  Mood:Normal: No  Mood: Depressed, Anxious, Sad  Motor Activity:Normal: Yes  Motor Activity: Increased  Eye Contact: Good  Observed Behavior: Cooperative, Preoccupied, Tearful  Sexual Misconduct History: Current - no  Preception: [place] to person  Attention:Normal: No  Attention: Distractible, Unable to concentrate  Thought Processes: Perseveration  Thought Content:Normal: No  Thought Content: Delusions, Preoccupations  Depression Symptoms: Change in energy level, Impaired concentration, Loss of interest  Anxiety Symptoms: Generalized  Martha Symptoms: Poor judgment  Hallucinations: Auditory (comment), Visual (comment)  Delusions: Yes  Delusions: Other (comment) (Believes her baby is in bed with her)  Memory:Normal: No  Memory: Poor recent, Poor remote  Insight and Judgment: No  Insight and Judgment: Poor judgment, Poor insight      Suicide Risk CSSR-S:  1) Within the past month, have you wished you were dead or wished you could go to sleep and not wake up? : Yes  2) Have you actually had any thoughts of killing yourself? : No  6) Have you ever done anything, started to do anything, or prepared to do anything to end your life?: No      PLAN/TREATMENT RECOMMENDATIONS UPDATE: Patient will take medication as prescribed, eat 75% of meals, attend groups, participate in milieu activities, participate in treatment team and care planning for discharge and follow up.           Suellen Diaz RN

## 2024-07-08 NOTE — BH NOTE
2031-PRN-Zanaflex 4mg ,Advil 400 mg, Atarax 25mg and Trazodone 50 mg given For anxiety, pain,and sleep.

## 2024-07-08 NOTE — BH NOTE
Writer spoke to pt's daughter Angela [phone] to discuss pt's cognitive status and placement needs. Daughter shared that she believes that it is time for her mother to go to placement due to her needs and her level of impairment which is supported by pt's MoCA scoring and recommendations from both her attending and her OP providers. Daughter shared that she would like for pt to be placed in either [facility] or [facility] to keep her close to home. She was aware that [facility] has a locked memory care unit, but did not know about [facility], her first choice.    Writer will complete PASRR application with [facility]'s information and have that submitted for placement.    Alfred Young MSW, LSW

## 2024-07-08 NOTE — BH NOTE
Carol is anxious and crying. She is upset that her baby has no where to go. \"And look at her, she is always just smiling.\" Pt is looking a wadded blankets in her bed. Xanax admin for anxiety and crying. Will monitor effect.

## 2024-07-09 PROCEDURE — 1240000000 HC EMOTIONAL WELLNESS R&B

## 2024-07-09 PROCEDURE — 6370000000 HC RX 637 (ALT 250 FOR IP): Performed by: PSYCHIATRY & NEUROLOGY

## 2024-07-09 PROCEDURE — 99233 SBSQ HOSP IP/OBS HIGH 50: CPT | Performed by: PSYCHIATRY & NEUROLOGY

## 2024-07-09 PROCEDURE — 6370000000 HC RX 637 (ALT 250 FOR IP)

## 2024-07-09 RX ADMIN — BENZTROPINE MESYLATE 1 MG: 1 TABLET ORAL at 20:06

## 2024-07-09 RX ADMIN — PERPHENAZINE 2 MG: 4 TABLET, FILM COATED ORAL at 20:06

## 2024-07-09 RX ADMIN — METOPROLOL SUCCINATE 25 MG: 25 TABLET, EXTENDED RELEASE ORAL at 08:38

## 2024-07-09 RX ADMIN — VENLAFAXINE HYDROCHLORIDE 150 MG: 75 CAPSULE, EXTENDED RELEASE ORAL at 08:38

## 2024-07-09 RX ADMIN — ATORVASTATIN CALCIUM 20 MG: 10 TABLET, FILM COATED ORAL at 08:38

## 2024-07-09 RX ADMIN — TRAZODONE HYDROCHLORIDE 50 MG: 50 TABLET ORAL at 21:59

## 2024-07-09 RX ADMIN — BENZTROPINE MESYLATE 1 MG: 1 TABLET ORAL at 08:38

## 2024-07-09 RX ADMIN — ALPRAZOLAM 0.5 MG: 0.5 TABLET ORAL at 19:29

## 2024-07-09 RX ADMIN — ACETAMINOPHEN 650 MG: 325 TABLET ORAL at 09:25

## 2024-07-09 RX ADMIN — ASPIRIN 81 MG: 81 TABLET, CHEWABLE ORAL at 08:38

## 2024-07-09 RX ADMIN — ROPINIROLE HYDROCHLORIDE 4 MG: 1 TABLET, FILM COATED ORAL at 20:06

## 2024-07-09 RX ADMIN — HYDROXYZINE HYDROCHLORIDE 25 MG: 25 TABLET ORAL at 19:29

## 2024-07-09 RX ADMIN — ARIPIPRAZOLE 5 MG: 5 TABLET ORAL at 20:06

## 2024-07-09 RX ADMIN — DONEPEZIL HYDROCHLORIDE 10 MG: 5 TABLET, FILM COATED ORAL at 08:38

## 2024-07-09 RX ADMIN — IBUPROFEN 400 MG: 400 TABLET, FILM COATED ORAL at 21:59

## 2024-07-09 NOTE — BH NOTE
Pharmacy list updated. Refills sent as requested but switched from class of normal to print when signed. Medications called to [facility] at [facility] and printed scripts faxed. Desirae Arellano MA     Writer provided physician with Expert Evaluation paperwork to be completed for pt's daughter to apply for guardianship. Writer will provide to daughter once completed.    PASRR submitted and pt ruled out due to dementia diagnosis. Writer left message for the Admissions team with [facility] ([facility]) for call back to discuss admission and transfer.    Alfred Young MSW, LSW

## 2024-07-10 PROCEDURE — 6370000000 HC RX 637 (ALT 250 FOR IP)

## 2024-07-10 PROCEDURE — 6370000000 HC RX 637 (ALT 250 FOR IP): Performed by: PSYCHIATRY & NEUROLOGY

## 2024-07-10 PROCEDURE — 1240000000 HC EMOTIONAL WELLNESS R&B

## 2024-07-10 RX ADMIN — TRAZODONE HYDROCHLORIDE 50 MG: 50 TABLET ORAL at 20:10

## 2024-07-10 RX ADMIN — ARIPIPRAZOLE 5 MG: 5 TABLET ORAL at 20:09

## 2024-07-10 RX ADMIN — HYDROXYZINE HYDROCHLORIDE 25 MG: 25 TABLET ORAL at 14:08

## 2024-07-10 RX ADMIN — ROPINIROLE HYDROCHLORIDE 4 MG: 1 TABLET, FILM COATED ORAL at 20:09

## 2024-07-10 RX ADMIN — VENLAFAXINE HYDROCHLORIDE 150 MG: 75 CAPSULE, EXTENDED RELEASE ORAL at 08:12

## 2024-07-10 RX ADMIN — HYDROXYZINE HYDROCHLORIDE 25 MG: 25 TABLET ORAL at 22:50

## 2024-07-10 RX ADMIN — ATORVASTATIN CALCIUM 20 MG: 10 TABLET, FILM COATED ORAL at 08:12

## 2024-07-10 RX ADMIN — TIZANIDINE 4 MG: 4 TABLET ORAL at 14:08

## 2024-07-10 RX ADMIN — PERPHENAZINE 2 MG: 4 TABLET, FILM COATED ORAL at 20:09

## 2024-07-10 RX ADMIN — BENZTROPINE MESYLATE 1 MG: 1 TABLET ORAL at 20:10

## 2024-07-10 RX ADMIN — IBUPROFEN 400 MG: 400 TABLET, FILM COATED ORAL at 20:10

## 2024-07-10 RX ADMIN — ASPIRIN 81 MG: 81 TABLET, CHEWABLE ORAL at 08:12

## 2024-07-10 RX ADMIN — BENZTROPINE MESYLATE 1 MG: 1 TABLET ORAL at 08:12

## 2024-07-10 RX ADMIN — METOPROLOL SUCCINATE 25 MG: 25 TABLET, EXTENDED RELEASE ORAL at 08:12

## 2024-07-10 RX ADMIN — DONEPEZIL HYDROCHLORIDE 10 MG: 5 TABLET, FILM COATED ORAL at 08:12

## 2024-07-10 RX ADMIN — OLANZAPINE 5 MG: 5 TABLET, FILM COATED ORAL at 21:41

## 2024-07-10 RX ADMIN — ALPRAZOLAM 0.5 MG: 0.5 TABLET ORAL at 20:10

## 2024-07-10 NOTE — BH NOTE
Writer informed daughter that physician will have Expert Evaluation completed in the morning for her to . She stated that herself or designee will arrive tomorrow to get the forms which will be in a sealed envelope at the nurses station.    Writer and daughter discussed next steps of the placement process and daughter will be assisting with reaching out to both [facility] and [facility]. She will provide them with writer's contact information for collaboration on admission.    Alfred Young MSW, LSW

## 2024-07-10 NOTE — BH NOTE
Call received from daughter, reporting that mom was upset.followed up with patient. Patient c/o anxiety requesting Atarax and Zanaflex given for muscle spasms.

## 2024-07-11 LAB
ANION GAP SERPL CALCULATED.3IONS-SCNC: 8 MMOL/L (ref 3–16)
BASOPHILS # BLD: 0 K/UL (ref 0–0.2)
BASOPHILS NFR BLD: 0.3 %
BUN SERPL-MCNC: 27 MG/DL (ref 7–20)
CALCIUM SERPL-MCNC: 9.1 MG/DL (ref 8.3–10.6)
CHLORIDE SERPL-SCNC: 100 MMOL/L (ref 99–110)
CO2 SERPL-SCNC: 31 MMOL/L (ref 21–32)
CREAT SERPL-MCNC: 0.8 MG/DL (ref 0.6–1.2)
DEPRECATED RDW RBC AUTO: 13.9 % (ref 12.4–15.4)
EOSINOPHIL # BLD: 0.5 K/UL (ref 0–0.6)
EOSINOPHIL NFR BLD: 6.1 %
GFR SERPLBLD CREATININE-BSD FMLA CKD-EPI: 84 ML/MIN/{1.73_M2}
GLUCOSE SERPL-MCNC: 84 MG/DL (ref 70–99)
HCT VFR BLD AUTO: 40.3 % (ref 36–48)
HGB BLD-MCNC: 13.5 G/DL (ref 12–16)
LYMPHOCYTES # BLD: 1.9 K/UL (ref 1–5.1)
LYMPHOCYTES NFR BLD: 23.4 %
MCH RBC QN AUTO: 31.2 PG (ref 26–34)
MCHC RBC AUTO-ENTMCNC: 33.6 G/DL (ref 31–36)
MCV RBC AUTO: 92.8 FL (ref 80–100)
MONOCYTES # BLD: 0.4 K/UL (ref 0–1.3)
MONOCYTES NFR BLD: 5.4 %
NEUTROPHILS # BLD: 5.3 K/UL (ref 1.7–7.7)
NEUTROPHILS NFR BLD: 64.8 %
PLATELET # BLD AUTO: 198 K/UL (ref 135–450)
PMV BLD AUTO: 10.4 FL (ref 5–10.5)
POTASSIUM SERPL-SCNC: 4 MMOL/L (ref 3.5–5.1)
RBC # BLD AUTO: 4.34 M/UL (ref 4–5.2)
SODIUM SERPL-SCNC: 139 MMOL/L (ref 136–145)
WBC # BLD AUTO: 8.1 K/UL (ref 4–11)

## 2024-07-11 PROCEDURE — 6370000000 HC RX 637 (ALT 250 FOR IP)

## 2024-07-11 PROCEDURE — 80048 BASIC METABOLIC PNL TOTAL CA: CPT

## 2024-07-11 PROCEDURE — 99233 SBSQ HOSP IP/OBS HIGH 50: CPT | Performed by: PSYCHIATRY & NEUROLOGY

## 2024-07-11 PROCEDURE — 1240000000 HC EMOTIONAL WELLNESS R&B

## 2024-07-11 PROCEDURE — 85025 COMPLETE CBC W/AUTO DIFF WBC: CPT

## 2024-07-11 PROCEDURE — 36415 COLL VENOUS BLD VENIPUNCTURE: CPT

## 2024-07-11 PROCEDURE — 6370000000 HC RX 637 (ALT 250 FOR IP): Performed by: PSYCHIATRY & NEUROLOGY

## 2024-07-11 RX ADMIN — HYDROXYZINE HYDROCHLORIDE 25 MG: 25 TABLET ORAL at 23:27

## 2024-07-11 RX ADMIN — PERPHENAZINE 2 MG: 4 TABLET, FILM COATED ORAL at 19:42

## 2024-07-11 RX ADMIN — METOPROLOL SUCCINATE 25 MG: 25 TABLET, EXTENDED RELEASE ORAL at 07:48

## 2024-07-11 RX ADMIN — ASPIRIN 81 MG: 81 TABLET, CHEWABLE ORAL at 07:48

## 2024-07-11 RX ADMIN — HYDROXYZINE HYDROCHLORIDE 25 MG: 25 TABLET ORAL at 16:01

## 2024-07-11 RX ADMIN — BENZTROPINE MESYLATE 1 MG: 1 TABLET ORAL at 19:43

## 2024-07-11 RX ADMIN — ARIPIPRAZOLE 5 MG: 5 TABLET ORAL at 19:42

## 2024-07-11 RX ADMIN — DONEPEZIL HYDROCHLORIDE 10 MG: 5 TABLET, FILM COATED ORAL at 07:49

## 2024-07-11 RX ADMIN — TIZANIDINE 4 MG: 4 TABLET ORAL at 23:27

## 2024-07-11 RX ADMIN — BENZTROPINE MESYLATE 1 MG: 1 TABLET ORAL at 07:48

## 2024-07-11 RX ADMIN — ATORVASTATIN CALCIUM 20 MG: 10 TABLET, FILM COATED ORAL at 07:49

## 2024-07-11 RX ADMIN — OLANZAPINE 5 MG: 5 TABLET, FILM COATED ORAL at 19:43

## 2024-07-11 RX ADMIN — VENLAFAXINE HYDROCHLORIDE 150 MG: 75 CAPSULE, EXTENDED RELEASE ORAL at 07:48

## 2024-07-11 RX ADMIN — TRAZODONE HYDROCHLORIDE 50 MG: 50 TABLET ORAL at 19:42

## 2024-07-11 RX ADMIN — ALPRAZOLAM 0.5 MG: 0.5 TABLET ORAL at 19:02

## 2024-07-11 RX ADMIN — ROPINIROLE HYDROCHLORIDE 4 MG: 1 TABLET, FILM COATED ORAL at 19:42

## 2024-07-11 RX ADMIN — ALPRAZOLAM 0.5 MG: 0.5 TABLET ORAL at 08:03

## 2024-07-11 RX ADMIN — OLANZAPINE 5 MG: 5 TABLET, FILM COATED ORAL at 08:55

## 2024-07-11 NOTE — BH NOTE
2010 PRN Ibuprofen 400 mg given to help with muscle spasms to neck and Prn Trazodone 50 mg given to aide with sleep. Xanax  0.5 for anxiety.

## 2024-07-11 NOTE — BH NOTE
2250 Prn Atarax 25 mg given for anxiety Patient pacing around unit and emotionally upset that we have her dog. Patient had multiple blankets wrapped up like baby blankets and convinced there was babys inside them women had left. Blankets removed from room and patient helped get to bed.

## 2024-07-11 NOTE — BH NOTE
Writer spoke to pt's daughter to confirm that the Expert Evaluation paperwork was ready for her. She or her friend will be arriving today around lunchtime to get it. She stated that she was unable to reach out to the facilities yesterday and writer validated that it is a lot of work to manage placement. Writer will be sending referral packets to both facilities today for consideration of placement.    Alfred Young MSW, LSW

## 2024-07-12 PROCEDURE — 1240000000 HC EMOTIONAL WELLNESS R&B

## 2024-07-12 PROCEDURE — 99233 SBSQ HOSP IP/OBS HIGH 50: CPT | Performed by: PSYCHIATRY & NEUROLOGY

## 2024-07-12 PROCEDURE — 6370000000 HC RX 637 (ALT 250 FOR IP)

## 2024-07-12 PROCEDURE — 6370000000 HC RX 637 (ALT 250 FOR IP): Performed by: PSYCHIATRY & NEUROLOGY

## 2024-07-12 RX ORDER — LOPERAMIDE HYDROCHLORIDE 2 MG/1
2 CAPSULE ORAL EVERY 6 HOURS PRN
Status: DISCONTINUED | OUTPATIENT
Start: 2024-07-12 | End: 2024-07-23 | Stop reason: HOSPADM

## 2024-07-12 RX ORDER — PERPHENAZINE 4 MG/1
4 TABLET ORAL NIGHTLY
Status: DISCONTINUED | OUTPATIENT
Start: 2024-07-12 | End: 2024-07-23

## 2024-07-12 RX ADMIN — PERPHENAZINE 4 MG: 4 TABLET, FILM COATED ORAL at 20:52

## 2024-07-12 RX ADMIN — ERGOCALCIFEROL 50000 UNITS: 1.25 CAPSULE ORAL at 09:02

## 2024-07-12 RX ADMIN — TRAZODONE HYDROCHLORIDE 50 MG: 50 TABLET ORAL at 20:52

## 2024-07-12 RX ADMIN — ALPRAZOLAM 0.5 MG: 0.5 TABLET ORAL at 18:23

## 2024-07-12 RX ADMIN — ROPINIROLE HYDROCHLORIDE 4 MG: 1 TABLET, FILM COATED ORAL at 20:51

## 2024-07-12 RX ADMIN — VENLAFAXINE HYDROCHLORIDE 150 MG: 75 CAPSULE, EXTENDED RELEASE ORAL at 09:03

## 2024-07-12 RX ADMIN — ALPRAZOLAM 0.5 MG: 0.5 TABLET ORAL at 09:03

## 2024-07-12 RX ADMIN — BENZTROPINE MESYLATE 1 MG: 1 TABLET ORAL at 09:02

## 2024-07-12 RX ADMIN — BENZTROPINE MESYLATE 1 MG: 1 TABLET ORAL at 20:51

## 2024-07-12 RX ADMIN — HYDROXYZINE HYDROCHLORIDE 25 MG: 25 TABLET ORAL at 12:28

## 2024-07-12 RX ADMIN — LOPERAMIDE HYDROCHLORIDE 2 MG: 2 CAPSULE ORAL at 20:51

## 2024-07-12 RX ADMIN — DONEPEZIL HYDROCHLORIDE 10 MG: 5 TABLET, FILM COATED ORAL at 09:02

## 2024-07-12 RX ADMIN — HYDROXYZINE HYDROCHLORIDE 25 MG: 25 TABLET ORAL at 23:26

## 2024-07-12 RX ADMIN — ASPIRIN 81 MG: 81 TABLET, CHEWABLE ORAL at 09:03

## 2024-07-12 RX ADMIN — ARIPIPRAZOLE 5 MG: 5 TABLET ORAL at 20:51

## 2024-07-12 RX ADMIN — TIZANIDINE 4 MG: 4 TABLET ORAL at 23:26

## 2024-07-12 RX ADMIN — ATORVASTATIN CALCIUM 20 MG: 10 TABLET, FILM COATED ORAL at 09:03

## 2024-07-12 NOTE — BH NOTE
PRN Zanaflex 4 mg PO administered for c/o muscle spasm to lower back 6/10.    PRN Atarax 25 mg PO administered for anxiety and restlessness patient pacing the unit.    0055 PRN Zanaflex 4 mg PO and Atarax 25 mg PO both effective patient sleeping at this time.

## 2024-07-12 NOTE — BH NOTE
Clinical referral faxed to [facility] [phone] and [facility] [phone].    Writer spoke with Darya in Admissions with [facility] [phone] who will review and call writer with any questions and determination.    Writer attempted to reach Admissions with [facility] but no one was available and there was no VM to leave a message.    Update: Darya called back to share that [facility] is unable to take pt due to their lack of aminata with [facility] due to new management.    Writer also sent clinical packet to Petey Morfin, Admission Director for [facility] [email or web address], for consideration of placement on their secured unit.    Alfred Young MSW, LSW

## 2024-07-13 PROCEDURE — 6370000000 HC RX 637 (ALT 250 FOR IP)

## 2024-07-13 PROCEDURE — 6370000000 HC RX 637 (ALT 250 FOR IP): Performed by: PSYCHIATRY & NEUROLOGY

## 2024-07-13 PROCEDURE — 1240000000 HC EMOTIONAL WELLNESS R&B

## 2024-07-13 PROCEDURE — 99233 SBSQ HOSP IP/OBS HIGH 50: CPT | Performed by: PSYCHIATRY & NEUROLOGY

## 2024-07-13 RX ADMIN — BENZTROPINE MESYLATE 1 MG: 1 TABLET ORAL at 20:42

## 2024-07-13 RX ADMIN — IBUPROFEN 400 MG: 400 TABLET, FILM COATED ORAL at 08:44

## 2024-07-13 RX ADMIN — ASPIRIN 81 MG: 81 TABLET, CHEWABLE ORAL at 08:44

## 2024-07-13 RX ADMIN — VENLAFAXINE HYDROCHLORIDE 150 MG: 75 CAPSULE, EXTENDED RELEASE ORAL at 08:44

## 2024-07-13 RX ADMIN — TRAZODONE HYDROCHLORIDE 50 MG: 50 TABLET ORAL at 22:49

## 2024-07-13 RX ADMIN — ATORVASTATIN CALCIUM 20 MG: 10 TABLET, FILM COATED ORAL at 08:43

## 2024-07-13 RX ADMIN — METOPROLOL SUCCINATE 25 MG: 25 TABLET, EXTENDED RELEASE ORAL at 08:44

## 2024-07-13 RX ADMIN — DONEPEZIL HYDROCHLORIDE 10 MG: 5 TABLET, FILM COATED ORAL at 08:45

## 2024-07-13 RX ADMIN — ARIPIPRAZOLE 5 MG: 5 TABLET ORAL at 20:42

## 2024-07-13 RX ADMIN — ALPRAZOLAM 0.5 MG: 0.5 TABLET ORAL at 20:42

## 2024-07-13 RX ADMIN — ALPRAZOLAM 0.5 MG: 0.5 TABLET ORAL at 08:44

## 2024-07-13 RX ADMIN — LOPERAMIDE HYDROCHLORIDE 2 MG: 2 CAPSULE ORAL at 08:45

## 2024-07-13 RX ADMIN — PERPHENAZINE 4 MG: 4 TABLET, FILM COATED ORAL at 20:42

## 2024-07-13 RX ADMIN — IBUPROFEN 400 MG: 400 TABLET, FILM COATED ORAL at 20:42

## 2024-07-13 RX ADMIN — BENZTROPINE MESYLATE 1 MG: 1 TABLET ORAL at 08:43

## 2024-07-13 RX ADMIN — ROPINIROLE HYDROCHLORIDE 4 MG: 1 TABLET, FILM COATED ORAL at 20:42

## 2024-07-13 NOTE — BH NOTE
Patient c/o anxiety and restlessness and muscle spasm to lower back 7/10.   PRN Zanaflex 4 mg PO administered for muscle spasm.  PRN Atarax 25 mg PO administered for anxiety and restlessness.     0130 PRN Zanaflex 4 mg PO for muscle spasm and PRN Atarax 25 mg PO for anxiety and restlessness effective patient sleeping.

## 2024-07-13 NOTE — BH NOTE
PRN Trazodone 50 mg PO administered for sleep.  PRN Imodium 2 mg PO administered for diarrhea.    2315 PRN Trazodone 50 mg PO for sleep ineffective.  PRN Imodium 2 mg PO for diarrhea effective.

## 2024-07-13 NOTE — BH NOTE
Patient c/o severe diarrhea, New order Imodium 2 mg PO every 6 hours PRN for diarrhea per Dr. Hebert.

## 2024-07-13 NOTE — BH NOTE
Patient alert to person place and time, anxious and restless pacing unit for several hours. Independent and continent with steady gait. Patient endorsed auditory hallucinations but unable to describe what the voices are saying, also endorses visual hallucinations stating \" There are a hundred babies in my room with my dog.\" Denies any thoughts of self harm or homicidal ideations. Will monitor for safety and offer support as needed.

## 2024-07-14 PROCEDURE — 6370000000 HC RX 637 (ALT 250 FOR IP): Performed by: PSYCHIATRY & NEUROLOGY

## 2024-07-14 PROCEDURE — 6370000000 HC RX 637 (ALT 250 FOR IP)

## 2024-07-14 PROCEDURE — 1240000000 HC EMOTIONAL WELLNESS R&B

## 2024-07-14 RX ADMIN — ATORVASTATIN CALCIUM 20 MG: 10 TABLET, FILM COATED ORAL at 09:09

## 2024-07-14 RX ADMIN — DONEPEZIL HYDROCHLORIDE 10 MG: 5 TABLET, FILM COATED ORAL at 09:11

## 2024-07-14 RX ADMIN — ROPINIROLE HYDROCHLORIDE 4 MG: 1 TABLET, FILM COATED ORAL at 21:00

## 2024-07-14 RX ADMIN — BENZTROPINE MESYLATE 1 MG: 1 TABLET ORAL at 21:00

## 2024-07-14 RX ADMIN — TRAZODONE HYDROCHLORIDE 50 MG: 50 TABLET ORAL at 21:00

## 2024-07-14 RX ADMIN — ALPRAZOLAM 0.5 MG: 0.5 TABLET ORAL at 20:59

## 2024-07-14 RX ADMIN — PERPHENAZINE 4 MG: 4 TABLET, FILM COATED ORAL at 21:00

## 2024-07-14 RX ADMIN — VENLAFAXINE HYDROCHLORIDE 150 MG: 75 CAPSULE, EXTENDED RELEASE ORAL at 09:09

## 2024-07-14 RX ADMIN — ARIPIPRAZOLE 5 MG: 5 TABLET ORAL at 21:00

## 2024-07-14 RX ADMIN — BENZTROPINE MESYLATE 1 MG: 1 TABLET ORAL at 09:11

## 2024-07-14 RX ADMIN — ASPIRIN 81 MG: 81 TABLET, CHEWABLE ORAL at 09:09

## 2024-07-14 RX ADMIN — IBUPROFEN 400 MG: 400 TABLET, FILM COATED ORAL at 21:00

## 2024-07-15 PROCEDURE — 6370000000 HC RX 637 (ALT 250 FOR IP)

## 2024-07-15 PROCEDURE — 6370000000 HC RX 637 (ALT 250 FOR IP): Performed by: PSYCHIATRY & NEUROLOGY

## 2024-07-15 PROCEDURE — 1240000000 HC EMOTIONAL WELLNESS R&B

## 2024-07-15 PROCEDURE — 99233 SBSQ HOSP IP/OBS HIGH 50: CPT | Performed by: PSYCHIATRY & NEUROLOGY

## 2024-07-15 RX ADMIN — BENZTROPINE MESYLATE 1 MG: 1 TABLET ORAL at 09:04

## 2024-07-15 RX ADMIN — PERPHENAZINE 4 MG: 4 TABLET, FILM COATED ORAL at 20:25

## 2024-07-15 RX ADMIN — BENZTROPINE MESYLATE 1 MG: 1 TABLET ORAL at 20:25

## 2024-07-15 RX ADMIN — HYDROXYZINE HYDROCHLORIDE 25 MG: 25 TABLET ORAL at 12:58

## 2024-07-15 RX ADMIN — IBUPROFEN 400 MG: 400 TABLET, FILM COATED ORAL at 20:26

## 2024-07-15 RX ADMIN — VENLAFAXINE HYDROCHLORIDE 150 MG: 75 CAPSULE, EXTENDED RELEASE ORAL at 09:04

## 2024-07-15 RX ADMIN — TRAZODONE HYDROCHLORIDE 50 MG: 50 TABLET ORAL at 20:25

## 2024-07-15 RX ADMIN — ASPIRIN 81 MG: 81 TABLET, CHEWABLE ORAL at 09:04

## 2024-07-15 RX ADMIN — ARIPIPRAZOLE 5 MG: 5 TABLET ORAL at 20:25

## 2024-07-15 RX ADMIN — TIZANIDINE 4 MG: 4 TABLET ORAL at 09:03

## 2024-07-15 RX ADMIN — ALPRAZOLAM 0.5 MG: 0.5 TABLET ORAL at 20:25

## 2024-07-15 RX ADMIN — ROPINIROLE HYDROCHLORIDE 4 MG: 1 TABLET, FILM COATED ORAL at 20:26

## 2024-07-15 RX ADMIN — DONEPEZIL HYDROCHLORIDE 10 MG: 5 TABLET, FILM COATED ORAL at 09:05

## 2024-07-15 RX ADMIN — ATORVASTATIN CALCIUM 20 MG: 10 TABLET, FILM COATED ORAL at 09:04

## 2024-07-15 NOTE — GROUP NOTE
Group Therapy Note    Date: 7/15/2024    Group Start Time: 1315  Group End Time: 1400  Group Topic: Recreational    [facility]    Maria Guadalupe Quinn        Group Therapy Note    Attendees: 5    Group members played 15 seconds of a theme song from the 70's and had to guess what show the song belonged to. The goal of group was to evoke memories, stimulate mental activity, promote social interaction, and improve well-being.     Notes:  Patient attended group for the majority of the time. Patient remained engaged and interacted appropriately with other members of the group.     Status After Intervention:  Improved    Participation Level: Active Listener and Interactive    Participation Quality: Appropriate, Attentive, and Sharing      Speech:  normal      Thought Process/Content: Logical      Affective Functioning: Congruent      Mood: euthymic      Level of consciousness:  Alert      Response to Learning: Able to verbalize current knowledge/experience      Endings: None Reported    Modes of Intervention: Socialization, Exploration, and Activity      Discipline Responsible: Behavorial Health Tech      Signature:  HARVEY BALDERAS

## 2024-07-16 PROCEDURE — 6370000000 HC RX 637 (ALT 250 FOR IP): Performed by: PSYCHIATRY & NEUROLOGY

## 2024-07-16 PROCEDURE — 1240000000 HC EMOTIONAL WELLNESS R&B

## 2024-07-16 PROCEDURE — 6370000000 HC RX 637 (ALT 250 FOR IP)

## 2024-07-16 PROCEDURE — 99233 SBSQ HOSP IP/OBS HIGH 50: CPT | Performed by: PSYCHIATRY & NEUROLOGY

## 2024-07-16 RX ORDER — ACETAMINOPHEN 325 MG/1
650 TABLET ORAL EVERY 6 HOURS PRN
Status: DISCONTINUED | OUTPATIENT
Start: 2024-07-16 | End: 2024-07-23 | Stop reason: HOSPADM

## 2024-07-16 RX ORDER — LIDOCAINE 4 G/G
1 PATCH TOPICAL DAILY
Status: DISCONTINUED | OUTPATIENT
Start: 2024-07-16 | End: 2024-07-23 | Stop reason: HOSPADM

## 2024-07-16 RX ADMIN — PERPHENAZINE 4 MG: 4 TABLET, FILM COATED ORAL at 21:10

## 2024-07-16 RX ADMIN — METOPROLOL SUCCINATE 25 MG: 25 TABLET, EXTENDED RELEASE ORAL at 08:47

## 2024-07-16 RX ADMIN — ATORVASTATIN CALCIUM 20 MG: 10 TABLET, FILM COATED ORAL at 08:48

## 2024-07-16 RX ADMIN — BENZTROPINE MESYLATE 1 MG: 1 TABLET ORAL at 21:10

## 2024-07-16 RX ADMIN — OLANZAPINE 5 MG: 5 TABLET, FILM COATED ORAL at 21:10

## 2024-07-16 RX ADMIN — ROPINIROLE HYDROCHLORIDE 4 MG: 1 TABLET, FILM COATED ORAL at 21:10

## 2024-07-16 RX ADMIN — DONEPEZIL HYDROCHLORIDE 10 MG: 5 TABLET, FILM COATED ORAL at 08:48

## 2024-07-16 RX ADMIN — TIZANIDINE 4 MG: 4 TABLET ORAL at 21:10

## 2024-07-16 RX ADMIN — TIZANIDINE 4 MG: 4 TABLET ORAL at 08:47

## 2024-07-16 RX ADMIN — ARIPIPRAZOLE 5 MG: 5 TABLET ORAL at 21:10

## 2024-07-16 RX ADMIN — VENLAFAXINE HYDROCHLORIDE 150 MG: 75 CAPSULE, EXTENDED RELEASE ORAL at 08:47

## 2024-07-16 RX ADMIN — BENZTROPINE MESYLATE 1 MG: 1 TABLET ORAL at 08:48

## 2024-07-16 RX ADMIN — ALPRAZOLAM 0.5 MG: 0.5 TABLET ORAL at 14:50

## 2024-07-16 RX ADMIN — TRAZODONE HYDROCHLORIDE 50 MG: 50 TABLET ORAL at 21:10

## 2024-07-16 RX ADMIN — ASPIRIN 81 MG: 81 TABLET, CHEWABLE ORAL at 08:47

## 2024-07-16 NOTE — BH NOTE
Writer spoke to pt's daughter, Angela, to provide updates and inquire if she has filed for guardianship. She stated that she gets paid tomorrow and will do this at the [facility]. Writer shared that pt was not accepted at [facility] and there has been no response from [facility]. Writer is awaiting response from [facility]. Daughter shared that [facility] next to the hospital would be another facility that she would consider as well to keep her mother close to home.    Writer reiterated with daughter that guardianship would be needed prior to placement as her mother does not want to go into facility placement. Mother has been exploring other family members that may be able to assist her with housing and care. Daughter shared that her brother told her that mother was thinking of living with her ex- with whom she has not had contact in a year. He is currently working in [place] and daughter recommends contact with him prior to exploring him as placement as pt may just be naming others to be discharged.    Alfred Young MSW, LSW

## 2024-07-16 NOTE — BH NOTE
Patient came to nurses station complaining of lidocaine patch on her neck was uncomfortable and asked if she could remove it. The patient removed the lidocaine patch from her neck and gave it to this writer.

## 2024-07-17 PROCEDURE — 6370000000 HC RX 637 (ALT 250 FOR IP)

## 2024-07-17 PROCEDURE — 1240000000 HC EMOTIONAL WELLNESS R&B

## 2024-07-17 PROCEDURE — 6370000000 HC RX 637 (ALT 250 FOR IP): Performed by: PSYCHIATRY & NEUROLOGY

## 2024-07-17 PROCEDURE — 97530 THERAPEUTIC ACTIVITIES: CPT

## 2024-07-17 PROCEDURE — 99233 SBSQ HOSP IP/OBS HIGH 50: CPT | Performed by: PSYCHIATRY & NEUROLOGY

## 2024-07-17 PROCEDURE — 97168 OT RE-EVAL EST PLAN CARE: CPT

## 2024-07-17 RX ADMIN — ALPRAZOLAM 0.5 MG: 0.5 TABLET ORAL at 15:13

## 2024-07-17 RX ADMIN — TRAZODONE HYDROCHLORIDE 50 MG: 50 TABLET ORAL at 21:22

## 2024-07-17 RX ADMIN — BENZTROPINE MESYLATE 1 MG: 1 TABLET ORAL at 21:22

## 2024-07-17 RX ADMIN — PERPHENAZINE 4 MG: 4 TABLET, FILM COATED ORAL at 21:22

## 2024-07-17 RX ADMIN — ATORVASTATIN CALCIUM 20 MG: 10 TABLET, FILM COATED ORAL at 08:12

## 2024-07-17 RX ADMIN — DONEPEZIL HYDROCHLORIDE 10 MG: 5 TABLET, FILM COATED ORAL at 08:12

## 2024-07-17 RX ADMIN — HYDROXYZINE HYDROCHLORIDE 25 MG: 25 TABLET ORAL at 13:01

## 2024-07-17 RX ADMIN — BENZTROPINE MESYLATE 1 MG: 1 TABLET ORAL at 08:12

## 2024-07-17 RX ADMIN — VENLAFAXINE HYDROCHLORIDE 150 MG: 75 CAPSULE, EXTENDED RELEASE ORAL at 08:12

## 2024-07-17 RX ADMIN — ALPRAZOLAM 0.5 MG: 0.5 TABLET ORAL at 21:26

## 2024-07-17 RX ADMIN — ARIPIPRAZOLE 5 MG: 5 TABLET ORAL at 21:22

## 2024-07-17 RX ADMIN — ROPINIROLE HYDROCHLORIDE 4 MG: 1 TABLET, FILM COATED ORAL at 21:21

## 2024-07-17 RX ADMIN — LOPERAMIDE HYDROCHLORIDE 2 MG: 2 CAPSULE ORAL at 17:54

## 2024-07-17 RX ADMIN — ASPIRIN 81 MG: 81 TABLET, CHEWABLE ORAL at 08:12

## 2024-07-17 NOTE — GROUP NOTE
Group Therapy Note    Date: 7/17/2024    Group Start Time: 1315  Group End Time: 1400  Group Topic: Recreational    [facility]    Maria Guadalupe Quinn        Group Therapy Note    Attendees: 4    Group members engaged in a card game.      Notes:  Patient attended group for majority of the time. Patient remained engaged and interacted approrpiately with other members of the group.    Status After Intervention:  Improved    Participation Level: Active Listener and Interactive    Participation Quality: Appropriate, Attentive, and Sharing      Speech:  normal      Thought Process/Content: Logical      Affective Functioning: Congruent      Mood: euthymic      Level of consciousness:  Alert      Response to Learning: Able to verbalize current knowledge/experience      Endings: None Reported    Modes of Intervention: Socialization, Exploration, and Activity      Discipline Responsible: Behavorial Health Tech      Signature:  HARVEY BALDERAS

## 2024-07-17 NOTE — BH NOTE

## 2024-07-17 NOTE — BH NOTE
Pt shared with writer that she is open to going to a facility close to her family in [facility]. Writer will send additional referrals out today.    Referrals and Response:    [facility] - Faxed    [facility] - Faxed    [facility]    [facility] - Admissions reviewing Kiara [phone]    [facility] - Admissions reviewing [place] [phone] - They will be sending an on-site reviewer to meet with pt. Pt and writer completed on-site interview with Site Directors.    Daughter called and shared that she will be filing the court documents today with help from her aunt for financial support. Writer shared that pt is more amenable to skilled placement today and daughter was happy to hear this.      Alfred Young MSW, LSW

## 2024-07-18 PROCEDURE — 1240000000 HC EMOTIONAL WELLNESS R&B

## 2024-07-18 PROCEDURE — 6370000000 HC RX 637 (ALT 250 FOR IP)

## 2024-07-18 PROCEDURE — 99233 SBSQ HOSP IP/OBS HIGH 50: CPT | Performed by: PSYCHIATRY & NEUROLOGY

## 2024-07-18 PROCEDURE — 6370000000 HC RX 637 (ALT 250 FOR IP): Performed by: PSYCHIATRY & NEUROLOGY

## 2024-07-18 RX ADMIN — ACETAMINOPHEN 650 MG: 325 TABLET ORAL at 08:41

## 2024-07-18 RX ADMIN — ALPRAZOLAM 0.5 MG: 0.5 TABLET ORAL at 20:35

## 2024-07-18 RX ADMIN — ATORVASTATIN CALCIUM 20 MG: 10 TABLET, FILM COATED ORAL at 08:29

## 2024-07-18 RX ADMIN — LOPERAMIDE HYDROCHLORIDE 2 MG: 2 CAPSULE ORAL at 12:26

## 2024-07-18 RX ADMIN — BENZTROPINE MESYLATE 1 MG: 1 TABLET ORAL at 08:29

## 2024-07-18 RX ADMIN — LOPERAMIDE HYDROCHLORIDE 2 MG: 2 CAPSULE ORAL at 16:03

## 2024-07-18 RX ADMIN — METOPROLOL SUCCINATE 25 MG: 25 TABLET, EXTENDED RELEASE ORAL at 08:29

## 2024-07-18 RX ADMIN — ARIPIPRAZOLE 5 MG: 5 TABLET ORAL at 20:33

## 2024-07-18 RX ADMIN — BENZTROPINE MESYLATE 1 MG: 1 TABLET ORAL at 20:32

## 2024-07-18 RX ADMIN — ASPIRIN 81 MG: 81 TABLET, CHEWABLE ORAL at 08:29

## 2024-07-18 RX ADMIN — VENLAFAXINE HYDROCHLORIDE 150 MG: 75 CAPSULE, EXTENDED RELEASE ORAL at 08:29

## 2024-07-18 RX ADMIN — TIZANIDINE 4 MG: 4 TABLET ORAL at 23:41

## 2024-07-18 RX ADMIN — ROPINIROLE HYDROCHLORIDE 4 MG: 1 TABLET, FILM COATED ORAL at 20:32

## 2024-07-18 RX ADMIN — TRAZODONE HYDROCHLORIDE 50 MG: 50 TABLET ORAL at 23:41

## 2024-07-18 RX ADMIN — PERPHENAZINE 4 MG: 4 TABLET, FILM COATED ORAL at 20:32

## 2024-07-18 RX ADMIN — DONEPEZIL HYDROCHLORIDE 10 MG: 5 TABLET, FILM COATED ORAL at 08:29

## 2024-07-18 NOTE — BH NOTE
Writer spoke to pt about the restriction on her dog being in the facility with her, per [facility] policies. Pt became upset at this and walked away from writer to her room. Nursing attempted to speak to pt as well about the facility and its amenities but pt was no open to receiving this information.    Pt approached the nurses' station later in the shift and, when asked about if a place had been found, she explained that she couldn't take her dog with her and that she was upset by this. She stated to the nurse that she did not want to talk anymore with writer or go to a place where she cannot have her dog.    [facility] is still processing her authorization at this time for admissions and writer and nursing will continue to work on pt's acceptance of their no pet policy. Pt would be allowed to visit with her dog and her daughter has already accepted responsibility for ongoing care of the dog between visits.    Writer attempted to reach pt's daughter, Angela, to update her but there was no answer and the VM was full.    Yumikor received a VM from Shilpa with [facility] [phone] who shared that they may have a bed available for pt on Monday in their female memory care unit. Writer attempted to call back but no answer. Writer will inquire about animals for pt's sake.    Alfred Young MSW, LSW

## 2024-07-18 NOTE — BH NOTE
Patient alert and oriented to person place and time, pleasant and cooperative, compliant with medications. Spoke with her grandson after signing authorization  which brightened her mood stated \" I'm so glad he called.\" Patient visible on unit laughing and social with peers. Denies any thoughts of self harm or homicidal ideation, denies A/V/H, reports anxiety 7/10, depression 9/10, voiced concerns over getting Dementia, verbal encouragement given, will monitor for safety and offer support as needed.    0014 PRN Trazodone 50 mg PO for sleep and PRN Xanax 0.5 mg for anxiety both effective.

## 2024-07-19 PROCEDURE — 6370000000 HC RX 637 (ALT 250 FOR IP)

## 2024-07-19 PROCEDURE — 99233 SBSQ HOSP IP/OBS HIGH 50: CPT | Performed by: PSYCHIATRY & NEUROLOGY

## 2024-07-19 PROCEDURE — 6370000000 HC RX 637 (ALT 250 FOR IP): Performed by: PSYCHIATRY & NEUROLOGY

## 2024-07-19 PROCEDURE — 1240000000 HC EMOTIONAL WELLNESS R&B

## 2024-07-19 RX ADMIN — ARIPIPRAZOLE 5 MG: 5 TABLET ORAL at 21:00

## 2024-07-19 RX ADMIN — DONEPEZIL HYDROCHLORIDE 10 MG: 5 TABLET, FILM COATED ORAL at 08:17

## 2024-07-19 RX ADMIN — TRAZODONE HYDROCHLORIDE 50 MG: 50 TABLET ORAL at 21:01

## 2024-07-19 RX ADMIN — ASPIRIN 81 MG: 81 TABLET, CHEWABLE ORAL at 08:17

## 2024-07-19 RX ADMIN — METOPROLOL SUCCINATE 25 MG: 25 TABLET, EXTENDED RELEASE ORAL at 08:17

## 2024-07-19 RX ADMIN — ERGOCALCIFEROL 50000 UNITS: 1.25 CAPSULE ORAL at 09:48

## 2024-07-19 RX ADMIN — ROPINIROLE HYDROCHLORIDE 4 MG: 1 TABLET, FILM COATED ORAL at 21:00

## 2024-07-19 RX ADMIN — HYDROXYZINE HYDROCHLORIDE 25 MG: 25 TABLET ORAL at 01:08

## 2024-07-19 RX ADMIN — VENLAFAXINE HYDROCHLORIDE 150 MG: 75 CAPSULE, EXTENDED RELEASE ORAL at 08:17

## 2024-07-19 RX ADMIN — LOPERAMIDE HYDROCHLORIDE 2 MG: 2 CAPSULE ORAL at 15:18

## 2024-07-19 RX ADMIN — ATORVASTATIN CALCIUM 20 MG: 10 TABLET, FILM COATED ORAL at 08:16

## 2024-07-19 RX ADMIN — ALPRAZOLAM 0.5 MG: 0.5 TABLET ORAL at 21:01

## 2024-07-19 RX ADMIN — HYDROXYZINE HYDROCHLORIDE 25 MG: 25 TABLET ORAL at 13:39

## 2024-07-19 RX ADMIN — PERPHENAZINE 4 MG: 4 TABLET, FILM COATED ORAL at 21:00

## 2024-07-19 RX ADMIN — BENZTROPINE MESYLATE 1 MG: 1 TABLET ORAL at 21:01

## 2024-07-19 RX ADMIN — BENZTROPINE MESYLATE 1 MG: 1 TABLET ORAL at 08:17

## 2024-07-19 NOTE — BH NOTE
Writer continues to work to find placement. Updated progress below:    Referrals and Response:    [facility] [place] - Denied by clinical team    [facility] [place] - May be able to be admitted Monday 7/22/2024 depending on bed availability.    [facility] - Denied by clinical team.    [place] - Admissions reviewing [place] [phone] - They will be sending an on-site reviewer to meet with pt. Pt and writer completed on-site interview with Site Directors. They are completing the pre-certification for admission. Pt was approved for admission when this is complete, but is not able to bring her dog with her into the facility. Pt currently refusing due to this.    Writer spoke with pt about her concerns for bringing her dog with her to the facility. Pt was able to express her concerns and show writer paperwork regarding the Fair Housing Act and its applicability to situation. Pt confirmed that her dog is a state-registered emotional support animal with documentation and maintained immunizations. As she is still awaiting authorization for facility, writer encouraged her to think about a possible compromise with the facility for frequent visitation should they continue to decline the dog coming to facility. Writer encouraged pt to advocate for herself with facility, but to allow herself to enter the facility for care while doing it so not to jeopardize her plan for a safe and effective treatment environment at discharge. Pt acknowledged this and will think on it.    Alfred Young MSW, LSW

## 2024-07-19 NOTE — GROUP NOTE
Group Therapy Note    Date: 7/19/2024    Group Start Time: 1330  Group End Time: 1415  Group Topic: Music Therapy    [facility]    Maria Guadalupe Hammer LPC    Group members engaged in music therapy activities. Members engaged in playing \"name that tune\" to increase memory, mood and socialization.     Group Therapy Note    Attendees: 6       Notes:  Carol participated in the game minimally as she was walking by. Carol declined to sit down and participate with group members.     Status After Intervention:  Unchanged    Participation Level: Minimal    Participation Quality: Appropriate and Sharing      Speech:  normal      Thought Process/Content: Logical      Affective Functioning: Congruent      Mood: depressed      Level of consciousness:  Alert      Response to Learning: Able to verbalize current knowledge/experience      Endings: None Reported    Modes of Intervention: Socialization and Activity      Discipline Responsible: /Counselor      Signature:  Maria Guadalupe Hammer LPC

## 2024-07-20 PROCEDURE — 6370000000 HC RX 637 (ALT 250 FOR IP)

## 2024-07-20 PROCEDURE — 6370000000 HC RX 637 (ALT 250 FOR IP): Performed by: PSYCHIATRY & NEUROLOGY

## 2024-07-20 PROCEDURE — 1240000000 HC EMOTIONAL WELLNESS R&B

## 2024-07-20 RX ADMIN — BENZTROPINE MESYLATE 1 MG: 1 TABLET ORAL at 20:21

## 2024-07-20 RX ADMIN — ALPRAZOLAM 0.5 MG: 0.5 TABLET ORAL at 20:15

## 2024-07-20 RX ADMIN — TRAZODONE HYDROCHLORIDE 50 MG: 50 TABLET ORAL at 20:15

## 2024-07-20 RX ADMIN — ATORVASTATIN CALCIUM 20 MG: 10 TABLET, FILM COATED ORAL at 08:50

## 2024-07-20 RX ADMIN — ROPINIROLE HYDROCHLORIDE 4 MG: 1 TABLET, FILM COATED ORAL at 20:21

## 2024-07-20 RX ADMIN — ACETAMINOPHEN 650 MG: 325 TABLET ORAL at 10:04

## 2024-07-20 RX ADMIN — HYDROXYZINE HYDROCHLORIDE 25 MG: 25 TABLET ORAL at 00:33

## 2024-07-20 RX ADMIN — DONEPEZIL HYDROCHLORIDE 10 MG: 5 TABLET, FILM COATED ORAL at 08:51

## 2024-07-20 RX ADMIN — PERPHENAZINE 4 MG: 4 TABLET, FILM COATED ORAL at 20:15

## 2024-07-20 RX ADMIN — VENLAFAXINE HYDROCHLORIDE 150 MG: 75 CAPSULE, EXTENDED RELEASE ORAL at 08:50

## 2024-07-20 RX ADMIN — ALPRAZOLAM 0.5 MG: 0.5 TABLET ORAL at 08:56

## 2024-07-20 RX ADMIN — HYDROXYZINE HYDROCHLORIDE 25 MG: 25 TABLET ORAL at 14:57

## 2024-07-20 RX ADMIN — ARIPIPRAZOLE 5 MG: 5 TABLET ORAL at 20:15

## 2024-07-20 RX ADMIN — ACETAMINOPHEN 650 MG: 325 TABLET ORAL at 20:15

## 2024-07-20 RX ADMIN — ASPIRIN 81 MG: 81 TABLET, CHEWABLE ORAL at 08:50

## 2024-07-20 RX ADMIN — BENZTROPINE MESYLATE 1 MG: 1 TABLET ORAL at 08:51

## 2024-07-20 NOTE — BH NOTE
Patient alert and oriented x3, pleasant, visible on unit socializing and playing games with peers.Compliant with medications, independent with steady gait. Denies any thoughts of self harm or homicidal intent, denies A/V/H, reports anxiety 9/10, depression 8/10, will monitor for safety and offer support as needed.   PRN Xanax 0.5 mg PO for anxiety  PRN Trazodone 50 mg PO for sleep starting to be effective along with PRN Atarax 25 mg.

## 2024-07-20 NOTE — BH NOTE
PRN Xanax 0.5 mg PO administered for c/o anxiety 9/10.  PRN Trazodone 50 mg PO administered for sleep.

## 2024-07-20 NOTE — BH NOTE
Patient restless and anxious, PRN Atarax 25 mg PO administered.    0200 PRN Atarax 25 mg PO effective for anxiety.

## 2024-07-21 PROCEDURE — 6370000000 HC RX 637 (ALT 250 FOR IP): Performed by: PSYCHIATRY & NEUROLOGY

## 2024-07-21 PROCEDURE — 99232 SBSQ HOSP IP/OBS MODERATE 35: CPT | Performed by: NURSE PRACTITIONER

## 2024-07-21 PROCEDURE — 6370000000 HC RX 637 (ALT 250 FOR IP)

## 2024-07-21 PROCEDURE — 1240000000 HC EMOTIONAL WELLNESS R&B

## 2024-07-21 RX ADMIN — ARIPIPRAZOLE 5 MG: 5 TABLET ORAL at 20:52

## 2024-07-21 RX ADMIN — ALPRAZOLAM 0.5 MG: 0.5 TABLET ORAL at 13:45

## 2024-07-21 RX ADMIN — ASPIRIN 81 MG: 81 TABLET, CHEWABLE ORAL at 08:31

## 2024-07-21 RX ADMIN — BENZTROPINE MESYLATE 1 MG: 1 TABLET ORAL at 08:31

## 2024-07-21 RX ADMIN — BENZTROPINE MESYLATE 1 MG: 1 TABLET ORAL at 20:53

## 2024-07-21 RX ADMIN — DONEPEZIL HYDROCHLORIDE 10 MG: 5 TABLET, FILM COATED ORAL at 08:33

## 2024-07-21 RX ADMIN — ALPRAZOLAM 0.5 MG: 0.5 TABLET ORAL at 21:00

## 2024-07-21 RX ADMIN — PERPHENAZINE 4 MG: 4 TABLET, FILM COATED ORAL at 20:52

## 2024-07-21 RX ADMIN — VENLAFAXINE HYDROCHLORIDE 150 MG: 75 CAPSULE, EXTENDED RELEASE ORAL at 08:30

## 2024-07-21 RX ADMIN — ROPINIROLE HYDROCHLORIDE 4 MG: 1 TABLET, FILM COATED ORAL at 20:52

## 2024-07-21 RX ADMIN — HYDROXYZINE HYDROCHLORIDE 25 MG: 25 TABLET ORAL at 08:33

## 2024-07-21 RX ADMIN — LOPERAMIDE HYDROCHLORIDE 2 MG: 2 CAPSULE ORAL at 07:04

## 2024-07-21 RX ADMIN — ATORVASTATIN CALCIUM 20 MG: 10 TABLET, FILM COATED ORAL at 08:32

## 2024-07-22 PROCEDURE — 6370000000 HC RX 637 (ALT 250 FOR IP): Performed by: PSYCHIATRY & NEUROLOGY

## 2024-07-22 PROCEDURE — 6370000000 HC RX 637 (ALT 250 FOR IP)

## 2024-07-22 PROCEDURE — 99233 SBSQ HOSP IP/OBS HIGH 50: CPT | Performed by: PSYCHIATRY & NEUROLOGY

## 2024-07-22 PROCEDURE — 1240000000 HC EMOTIONAL WELLNESS R&B

## 2024-07-22 RX ADMIN — PERPHENAZINE 4 MG: 4 TABLET, FILM COATED ORAL at 20:09

## 2024-07-22 RX ADMIN — DONEPEZIL HYDROCHLORIDE 10 MG: 5 TABLET, FILM COATED ORAL at 10:23

## 2024-07-22 RX ADMIN — BENZTROPINE MESYLATE 1 MG: 1 TABLET ORAL at 20:10

## 2024-07-22 RX ADMIN — ASPIRIN 81 MG: 81 TABLET, CHEWABLE ORAL at 10:23

## 2024-07-22 RX ADMIN — ATORVASTATIN CALCIUM 20 MG: 10 TABLET, FILM COATED ORAL at 10:23

## 2024-07-22 RX ADMIN — ARIPIPRAZOLE 5 MG: 5 TABLET ORAL at 20:09

## 2024-07-22 RX ADMIN — METOPROLOL SUCCINATE 25 MG: 25 TABLET, EXTENDED RELEASE ORAL at 10:23

## 2024-07-22 RX ADMIN — ALPRAZOLAM 0.5 MG: 0.5 TABLET ORAL at 12:37

## 2024-07-22 RX ADMIN — ROPINIROLE HYDROCHLORIDE 4 MG: 1 TABLET, FILM COATED ORAL at 20:10

## 2024-07-22 RX ADMIN — VENLAFAXINE HYDROCHLORIDE 150 MG: 75 CAPSULE, EXTENDED RELEASE ORAL at 17:02

## 2024-07-22 RX ADMIN — BENZTROPINE MESYLATE 1 MG: 1 TABLET ORAL at 10:23

## 2024-07-22 RX ADMIN — ALPRAZOLAM 0.5 MG: 0.5 TABLET ORAL at 20:09

## 2024-07-22 RX ADMIN — TRAZODONE HYDROCHLORIDE 50 MG: 50 TABLET ORAL at 20:09

## 2024-07-23 VITALS
SYSTOLIC BLOOD PRESSURE: 120 MMHG | RESPIRATION RATE: 16 BRPM | TEMPERATURE: 97.8 F | DIASTOLIC BLOOD PRESSURE: 61 MMHG | OXYGEN SATURATION: 98 % | HEART RATE: 72 BPM | HEIGHT: 67 IN | WEIGHT: 110.9 LBS | BODY MASS INDEX: 17.4 KG/M2

## 2024-07-23 PROBLEM — E78.5 HLD (HYPERLIPIDEMIA): Status: ACTIVE | Noted: 2024-07-23

## 2024-07-23 PROBLEM — G31.84 MILD COGNITIVE IMPAIRMENT: Status: ACTIVE | Noted: 2024-07-04

## 2024-07-23 PROBLEM — G89.29 CHRONIC PAIN: Status: ACTIVE | Noted: 2024-07-23

## 2024-07-23 PROBLEM — F29 PSYCHOSIS (HCC): Status: ACTIVE | Noted: 2024-07-04

## 2024-07-23 PROBLEM — G25.81 RLS (RESTLESS LEGS SYNDROME): Status: ACTIVE | Noted: 2024-07-23

## 2024-07-23 PROBLEM — I10 HTN (HYPERTENSION): Status: ACTIVE | Noted: 2024-07-23

## 2024-07-23 PROBLEM — I51.81 TAKOTSUBO CARDIOMYOPATHY: Status: ACTIVE | Noted: 2024-07-23

## 2024-07-23 PROCEDURE — 5130000000 HC BRIDGE APPOINTMENT

## 2024-07-23 PROCEDURE — 6370000000 HC RX 637 (ALT 250 FOR IP): Performed by: PSYCHIATRY & NEUROLOGY

## 2024-07-23 PROCEDURE — 6370000000 HC RX 637 (ALT 250 FOR IP)

## 2024-07-23 RX ORDER — VENLAFAXINE HYDROCHLORIDE 150 MG/1
150 CAPSULE, EXTENDED RELEASE ORAL EVERY MORNING
Qty: 30 CAPSULE | Refills: 0 | Status: SHIPPED | OUTPATIENT
Start: 2024-07-24

## 2024-07-23 RX ORDER — LIDOCAINE 4 G/G
1 PATCH TOPICAL DAILY
Qty: 15 EACH | Refills: 0 | Status: SHIPPED | OUTPATIENT
Start: 2024-07-24 | End: 2024-08-08

## 2024-07-23 RX ORDER — PERPHENAZINE 4 MG/1
4 TABLET ORAL 2 TIMES DAILY WITH MEALS
Qty: 60 TABLET | Refills: 0 | Status: SHIPPED | OUTPATIENT
Start: 2024-07-23

## 2024-07-23 RX ORDER — ASPIRIN 81 MG/1
81 TABLET, CHEWABLE ORAL DAILY
Qty: 30 TABLET | Refills: 0 | Status: SHIPPED | OUTPATIENT
Start: 2024-07-24

## 2024-07-23 RX ORDER — PERPHENAZINE 4 MG/1
4 TABLET ORAL 2 TIMES DAILY WITH MEALS
Status: DISCONTINUED | OUTPATIENT
Start: 2024-07-23 | End: 2024-07-23 | Stop reason: HOSPADM

## 2024-07-23 RX ORDER — ERGOCALCIFEROL 1.25 MG/1
50000 CAPSULE ORAL WEEKLY
Qty: 5 CAPSULE | Refills: 0 | Status: SHIPPED | OUTPATIENT
Start: 2024-07-26

## 2024-07-23 RX ADMIN — BENZTROPINE MESYLATE 1 MG: 1 TABLET ORAL at 08:30

## 2024-07-23 RX ADMIN — PERPHENAZINE 4 MG: 4 TABLET, FILM COATED ORAL at 17:12

## 2024-07-23 RX ADMIN — ALPRAZOLAM 0.5 MG: 0.5 TABLET ORAL at 14:06

## 2024-07-23 RX ADMIN — VENLAFAXINE HYDROCHLORIDE 150 MG: 75 CAPSULE, EXTENDED RELEASE ORAL at 08:29

## 2024-07-23 RX ADMIN — METOPROLOL SUCCINATE 25 MG: 25 TABLET, EXTENDED RELEASE ORAL at 08:31

## 2024-07-23 RX ADMIN — ACETAMINOPHEN 650 MG: 325 TABLET ORAL at 14:06

## 2024-07-23 RX ADMIN — DONEPEZIL HYDROCHLORIDE 10 MG: 5 TABLET, FILM COATED ORAL at 08:29

## 2024-07-23 RX ADMIN — ATORVASTATIN CALCIUM 20 MG: 10 TABLET, FILM COATED ORAL at 08:30

## 2024-07-23 RX ADMIN — HYDROXYZINE HYDROCHLORIDE 25 MG: 25 TABLET ORAL at 11:02

## 2024-07-23 RX ADMIN — ASPIRIN 81 MG: 81 TABLET, CHEWABLE ORAL at 08:30

## 2024-07-23 NOTE — PROGRESS NOTES
Group Therapy Note    Date: 7/18/2024  Start Time: 1000  End Time:  1100  Number of Participants: 6    Type of Group: Music and Spirituality    Patient's Goal:  Participation    Notes:   facilitated discussion on spirituality, focusing on connection, meaning, and hope, through the medium of music. Pt present for final 15 minutes of group. While present, Pt actively participated by making a song selection, singing along to music, and sharing reflections on songs.    Participation Level: Active Listener and Interactive    Participation Quality: Appropriate, Attentive, and Sharing      Speech:  normal      Affective Functioning: Congruent      Endings: None Reported    Modes of Intervention: Support, Socialization, Exploration, Activity, and Media      Discipline Responsible:       Signature:  Marcus Mike       07/18/24 2661   Encounter Summary   Encounter Overview/Reason Behavioral Health   Service Provided For Patient   Last Encounter    (7/18 Music and Spirituality Group)   Complexity of Encounter Moderate   Begin Time 1045   End Time  1100   Total Time Calculated 15 min   Behavioral Health    Type  Spirituality Group       
      Behavioral Services                                              Medicare Re-Certification    I certify that the inpatient psychiatric hospital services furnished since the previous certification/re-certification were, and continue to be, medically necessary for;    [x] (1) Treatment which could reasonably be expected to improve the patient's condition,    [x] (2) Or for diagnostic study.    Estimated length of stay/service 5 d    Plan for post-hospital care outtp    This patient continues to need, on a daily basis, active treatment furnished directly by or requiring the supervision of inpatient psychiatric personnel.    Electronically signed by KERON ISRAEL MD on 7/12/2024 at 1:31 PM   
      Behavioral Services  Medicare Certification Upon Admission    I certify that this patient's inpatient psychiatric hospital admission is medically necessary for:    [x] (1) Treatment which could reasonably be expected to improve this patient's condition,       [x] (2) Or for diagnostic study;     AND     [x](2) The inpatient psychiatric services are provided while the individual is under the care of a physician and are included in the individualized plan of care.    Estimated length of stay/service 5 d    Plan for post-hospital care outpt    Electronically signed by KERON ISRAEL MD on 7/4/2024 at 12:20 PM      
   07/15/24 1345   Encounter Summary   Encounter Overview/Reason Spiritual/Emotional Needs   Service Provided For Patient   Last Encounter    (7/15: rounding, Carol stopped me in common area and shared about her struggles; encouragement, prayer. has a daughter at home and her dog whom she misses.)   Spiritual/Emotional needs   Type Spiritual Support     Thank you for consulting Spiritual Health    If you would like a 's presence for emotional, spiritual, grief or comfort care,   please dial \"[phone]\" and ask for the  on-call to be paged.    For help with Advanced Care Planning, Power of  for Healthcare or Living Will forms, you may also call us directly:    [phone]) Jimmie  [phone]) Kirstie  [phone]) Outpatient    Former[facility]      
 `Behavioral Health [place]  Admission Note     Admission Type:   Admission Type: Involuntary    Reason for admission:  Reason for Admission: Pt has been increasingly confused and altered. She has not been able to take care of herself at home. Pt has poor safety awareness    PATIENT STRENGTHS:       Patient Strengths and Limitations:       Addictive Behavior:   Addictive Behavior  In the Past 3 Months, Have You Felt or Has Someone Told You That You Have a Problem With  : None    Medical Problems:   Past Medical History:   Diagnosis Date    Anxiety     Bipolar 1 disorder ([facility])     CAD (coronary artery disease)     Dementia ([facility])     Hypertension     NSTEMI (non-ST elevated myocardial infarction) ([facility])        Status EXAM:  Mental Status and Behavioral Exam  Normal: No  Level of Assistance: Independent/Self  Facial Expression: Worried  Affect: Congruent  Level of Consciousness: Alert  Frequency of Checks: 4 times per hour, close  Mood:Normal: No  Mood: Anxious  Motor Activity:Normal: Yes  Eye Contact: Good  Observed Behavior: Impulsive, Cooperative  Sexual Misconduct History: Current - no  Preception: [place] to person, [place] to place  Attention:Normal: No  Attention: Distractible  Thought Processes: Perseveration  Thought Content:Normal: No  Thought Content: Paranoia  Depression Symptoms: No problems reported or observed.  Anxiety Symptoms: Generalized  Martha Symptoms: No problems reported or observed.  Hallucinations: None  Delusions: Yes  Delusions: Paranoid  Memory:Normal: No  Memory: Poor recent, Poor remote  Insight and Judgment: No  Insight and Judgment: Poor judgment, Poor insight    Tobacco Screening:  Practical Counseling, on admission, azael X, if applicable and completed (first 3 are required if patient doesn't refuse):            ( )  Recognizing danger situations (included triggers and roadblocks)                    ( )  Coping skills (new ways to manage stress, exercise, relaxation techniques, changing 
4 Eyes Skin Assessment     The patient is being assessed for  Admission    I agree that 2 RN's have performed a thorough Head to Toe Skin Assessment on the patient. ALL assessment sites listed below have been assessed.       Areas assessed for pressure by both nurses: yes  [x]   Head, Face, and Ears   [x]   Shoulders, Back, and Chest  [x]   Arms, Elbows, and Hands   [x]   Coccyx, Sacrum, and Ischum  [x]   Legs, Feet, and Heels                                Skin Assessed Under all Medical Devices by both nurses:  N/a               All Mepilex Borders were peeled back and area peeked at by both nurses:  No: n/a  Please list where Mepilex Borders are located:  n/a                 Does the Patient have Skin Breakdown related to pressure?  No     (Insert Photo heren/a)         Andrew Prevention initiated:  NA   Wound Care Orders initiated:  NA      [facility] nurse consulted for Pressure Injury (Stage 3,4, Unstageable, DTI, NWPT, Complex wounds)and New or Established Ostomies:  NA        Nurse 1 eSignature: Electronically signed by Shayna Anthony RN on 7/4/24 at 10:07 AM EDT    **SHARE this note so that the co-signing nurse is able to place an eSignature**    Nurse 2 eSignature: Electronically signed by Harika Chaidez RN on 7/4/24 at 1:52 PM EDT    
Bedside Mobility Assessment Tool (BMAT):     Assessment Level 1- Sit and Shake    1. From a semi-reclined position, ask patient to sit up and rotate to a seated position at the side of the bed. Can use the bedrail.    2. Ask patient to reach out and grab your hand and shake making sure patient reaches across his/her midline.   Pass- Patient is able to come to a seated position, maintain core strength. Maintains seated balance while reaching across midline. Move on to Assessment Level 2.     Assessment Level 2- Stretch and Point   1. With patient in seated position at the side of the bed, have patient place both feet on the floor (or stool) with knees no higher than hips.    2. Ask patient to stretch one leg and straighten the knee, then bend the ankle/flex and point the toes. If appropriate, repeat with the other leg.   Pass- Patient is able to demonstrate appropriate quad strength on intended weight bearing limb(s). Move onto Assessment Level 3.     Assessment Level 3- Stand   1. Ask patient to elevate off the bed or chair (seated to standing) using an assistive device (cane, bedrail).    2. Patient should be able to raise buttocks off be and hold for a count of five. May repeat once.   Pass- Patient maintains standing stability for at least 5 seconds, proceed to assessment level 4.    Assessment Level 4- Walk   1. Ask patient to march in place at bedside.    2. Then ask patient to advance step and return each foot. Some medical conditions may render a patient from stepping backwards, use your best clinical judgement.   Pass- Patient demonstrates balance while shifting weight and ability to step, takes independent steps, does not use assistive device patient is MOBILITY LEVEL 4.      Mobility Level- 4   
Carol is alert and oriented only to person. Stated she was at \"[facility].\" Could not identify the year. She has been pleasant, cooperative, and med compliant. Denies SI/HI. Denies AVH \"today.\" Says she is doing better. Requested PRN Tylenol for HA 9/10 @ 2113 which was effective. /56, HR 70 this shift. Encouraged to drink water but pt states she doesn't like water. She does like Sprite. She was up at 0000 and thought it was morning. Easily redirected back to bed. Will continue to monitor overnight.    
Carol started out the evening as bright and social. She was very happy and vocal about it being 6 days since hearing any voices. Oriented x 4.Talked about being diagnosed with dementia this past December. States she has not been aggressive but feels angry a lot. Increasingly anxious later. Med compliant. No c/o pain. Lidocaine patches removed per order. Requested PRN Xanax @ 2035, PRN Trazodone and Zanaflex @ 2341, and PRN Atarax @ 0108. Asleep by 0145 and appears to be resting well. Will continue to monitor.  
Comprehensive Nutrition Assessment    Type and Reason for Visit:  Initial, Positive Nutrition Screen (MST 2)    Nutrition Recommendations/Plan:   Continue Regular Diet  Added Ensure Plus TID      Malnutrition Assessment:  Malnutrition Status:  Severe malnutrition (07/04/24 1700)    Context:  Social/Environmental Circumstances     Findings of the 6 clinical characteristics of malnutrition:  Energy Intake:  Unable to assess  Weight Loss:  Greater than 20% over 1 year     Body Fat Loss:  Severe body fat loss Orbital, Buccal region   Muscle Mass Loss:  Severe muscle mass loss Temples (temporalis), Clavicles (pectoralis & deltoids), Calf (gastrocnemius)  Fluid Accumulation:  No significant fluid accumulation Extremities      Nutrition Assessment:    Pt. severely malnourished AEB she has noted wt loss of 20% for her UBW of 140# to a current weight of 110.9#; mod to sev muscle/fat loss.  At risk for further nutritional compromise r/t cognitive changes; depression and altered nutrition related labs.  Will add ensure plus to meals .     Nutrition Related Findings:    pt was alert to name and place; she was friendly and reported she does eat and has food at home; sheis currnelty struggleing with some memory issues;  Vit D 6/4/24 was low 22, K+ low Wound Type: None       Current Nutrition Intake & Therapies:    Average Meal Intake: %  Average Supplements Intake: None Ordered  ADULT DIET; Regular; Safety Tray; Safety Tray (Disposables)    Anthropometric Measures:  Height: 170.2 cm (5' 7.01\")  Ideal Body Weight (IBW): 135 lbs (61 kg)    Admission Body Weight: 50.3 kg (110 lb 14.3 oz)  Current Body Weight: 50.3 kg (110 lb 14.3 oz), 82.1 % IBW. Weight Source: Standing Scale  Current BMI (kg/m2): 17.4  Usual Body Weight: 63.5 kg (140 lb) (per pt report , though no time frame)  % Weight Change (Calculated): -20.8                    BMI Categories: Underweight (BMI less than 18.5)    Estimated Daily Nutrient Needs:  Energy 
Comprehensive Nutrition Assessment    Type and Reason for Visit:  Reassess    Nutrition Recommendations/Plan:   Continue ADULT DIET; Regular diet order + Safety Tray (Disposables) restriction - please document meal intake % in flow sheets for each meal. No po intake data documented in flow sheets x 13 days admission.   Continue Ensure Plus with meals - please document ONS intake % in flow sheets. No ONS intake data is documented in flow sheets.   Monitor appetite, meal intake, and acceptance/intake of ONS.   Please obtain an updated weight for this patient - last weight was obtained on 7/5/24.   Monitor mental status, bowel function, and weight trends.      Malnutrition Assessment:  Malnutrition Status:  Severe malnutrition (07/17/24 1401)    Context:  Social/Environmental Circumstances     Findings of the 6 clinical characteristics of malnutrition:  Energy Intake:  Mild decrease in energy intake   Weight Loss:  No significant weight loss     Body Fat Loss:  Severe body fat loss Orbital, Buccal region   Muscle Mass Loss:  Severe muscle mass loss Temples (temporalis), Clavicles (pectoralis & deltoids), Calf (gastrocnemius)  Fluid Accumulation:  No significant fluid accumulation Extremities   Strength:  Not Performed    Nutrition Assessment:    patient continues to improve from a nutritional standpoint AEB improved appetite and po intake + mental status is improved; she remains at risk for further compromise d/t severe malnutrition diagnosis and dementia/inability to care for herself; will continue ADULT DIET; Regular diet order + Safety Tray (Disposables) restriction and Ensure Plus with meals    Nutrition Related Findings:    patient is A & O x 3; patient was dropped off by her daughter in the ED and patient seemed confused and unable to care for herself; patient's mental status has improved during admission and she will likely d/c to a facility upon d/c; patient's appetite and po intake is improved as her 
Comprehensive Nutrition Assessment    Type and Reason for Visit:  Reassess    Nutrition Recommendations/Plan:   Continue Regular diet  Continue Ensure Plus  Weight every week and document  Redord the % conumed of meals and supps in flowsheets      Malnutrition Assessment:  Malnutrition Status:  Severe malnutrition (07/04/24 1700)    Context:  Social/Environmental Circumstances     Findings of the 6 clinical characteristics of malnutrition:  Energy Intake:  Unable to assess  Weight Loss:  Greater than 20% over 1 year     Body Fat Loss:  Severe body fat loss Orbital, Buccal region   Muscle Mass Loss:  Severe muscle mass loss Temples (temporalis), Clavicles (pectoralis & deltoids), Calf (gastrocnemius)  Fluid Accumulation:  No significant fluid accumulation Extremities      Nutrition Assessment:    Pt improving from a nutritional standpoint AEB Md documenting appetite as adequate .  Remains at risk for further nutritional compromise r/t confusion and dx of Severe PCM at admission.  Will continue to monitor offer supps and regular diet      Nutrition Related Findings:    pt is noted adequate appetite by medical staff in charts notes Wound Type: None       Current Nutrition Intake & Therapies:    Average Meal Intake: Unable to assess  Average Supplements Intake: Unable to assess  ADULT DIET; Regular; Safety Tray; Safety Tray (Disposables)  ADULT ORAL NUTRITION SUPPLEMENT; Breakfast, Lunch, Dinner; Standard High Calorie/High Protein Oral Supplement    Anthropometric Measures:  Height: 170.2 cm (5' 7.01\")  Ideal Body Weight (IBW): 135 lbs (61 kg)    Admission Body Weight: 50.3 kg (110 lb 14.3 oz)  Current Body Weight: 50.3 kg (110 lb 14.3 oz), 82.1 % IBW. Weight Source: Standing Scale  Current BMI (kg/m2): 17.4  Usual Body Weight: 63.5 kg (140 lb) (per pt report , though no time frame)  % Weight Change (Calculated): -20.8                    BMI Categories: Underweight (BMI less than 18.5)    Estimated Daily Nutrient 
Department of Psychiatry  AttendingProgress Note  Chief Complaint: delusional  Carol appeared in better spirits today . She is not seeing the babies nor dog in her room. She appears calmer and more organized.  No med changes  Patient's chart was reviewed and collaborated with  about the treatment plan.  SUBJECTIVE:    Patient is feeling better. Suicidal ideation:  denies suicidal ideation.  Patient does not have medication side effects.    ROS: Patient has new complaints: no  Sleeping adequately:  Yes   Appetite adequate: Yes  Attending groups: Yes  Visitors:Yes    OBJECTIVE    Physical  VITALS:  /68   Pulse 53   Temp 97.5 °F (36.4 °C) (Temporal)   Resp 18   Ht 1.702 m (5' 7.01\")   Wt 50.3 kg (110 lb 14.4 oz) Comment: 110.5  SpO2 96%   BMI 17.37 kg/m²     Mental Status Examination:  Patients appearance was ill-appearing. Thoughts are Goal directed. Homicidal ideations none.  No abnormal movements, tics or mannerisms.  Memory intact Aims 0. Concentration Fair.   Alert and oriented X 4. Insight and Judgement impaired insight. Patient was cooperative. Patient gait normal. Mood constricted, affect flat affect Hallucinations Absent, suicidal ideations no specific plan to harm self Speech normal volume  Data  Labs:   Admission on 07/03/2024   Component Date Value Ref Range Status    Color, UA 07/03/2024 Straw  Straw/Yellow Final    Clarity, UA 07/03/2024 Clear  Clear Final    Glucose, Ur 07/03/2024 Negative  Negative mg/dL Final    Bilirubin, Urine 07/03/2024 Negative  Negative Final    Ketones, Urine 07/03/2024 Negative  Negative mg/dL Final    Specific Gravity, UA 07/03/2024 <=1.005  1.005 - 1.030 Final    Blood, Urine 07/03/2024 Negative  Negative Final    pH, Urine 07/03/2024 7.0  5.0 - 8.0 Final    Protein, UA 07/03/2024 Negative  Negative mg/dL Final    Urobilinogen, Urine 07/03/2024 0.2  <2.0 E.U./dL Final    Nitrite, Urine 07/03/2024 Negative  Negative Final    Leukocyte Esterase, Urine 
Department of Psychiatry  AttendingProgress Note  Chief Complaint: dementia   Carol is showing improvement with regard to her psychosis. She is less agitated and is able to have a coherent conversation.   She is open to an assisted living situation in [facility]. Daughter has not filedguardianship paperwork.     Patient's chart was reviewed and collaborated with  about the treatment plan.  SUBJECTIVE:    Patient is feeling better. Suicidal ideation:  denies suicidal ideation.  Patient does not have medication side effects.    ROS: Patient has new complaints: no  Sleeping adequately:  Yes   Appetite adequate: Yes  Attending groups: Yes  Visitors:Yes    OBJECTIVE    Physical  VITALS:  /69   Pulse 56   Temp 98 °F (36.7 °C) (Temporal)   Resp 18   Ht 1.702 m (5' 7.01\")   Wt 50.3 kg (110 lb 14.4 oz) Comment: 110.5  SpO2 96%   BMI 17.37 kg/m²     Mental Status Examination:  Patients appearance was street clothes. Thoughts are Goal directed. Homicidal ideations none.  No abnormal movements, tics or mannerisms.  Memory intact Aims 0. Concentration Fair.   Alert and oriented X 4. Insight and Judgement impaired insight. Patient was cooperative. Patient gait normal. Mood constricted, affect depressed affect Hallucinations Absent, suicidal ideations no specific plan to harm self Speech normal volume  Data  Labs:   Admission on 07/03/2024   Component Date Value Ref Range Status    Color, UA 07/03/2024 Straw  Straw/Yellow Final    Clarity, UA 07/03/2024 Clear  Clear Final    Glucose, Ur 07/03/2024 Negative  Negative mg/dL Final    Bilirubin, Urine 07/03/2024 Negative  Negative Final    Ketones, Urine 07/03/2024 Negative  Negative mg/dL Final    Specific Gravity, UA 07/03/2024 <=1.005  1.005 - 1.030 Final    Blood, Urine 07/03/2024 Negative  Negative Final    pH, Urine 07/03/2024 7.0  5.0 - 8.0 Final    Protein, UA 07/03/2024 Negative  Negative mg/dL Final    Urobilinogen, Urine 07/03/2024 0.2  <2.0 
Department of Psychiatry  AttendingProgress Note  Chief Complaint: dementia and delusional   aCrol remains disoriented. Her MOCA was .   She believes that she has been here 3 weeks. She is aware of her age and  but is not clear as to location and reason for being here.   She also believes that she had 4 babies in her room and pointed to her bed as if a baby was next to her.   She believes that her grandson is dead.   Will lower Effexor to 150 mg QD and taper ff Abilify. Add Perphenazine 2 mg HS  for psychosis.  QTc 446  Patient's chart was reviewed and collaborated with  about the treatment plan.  SUBJECTIVE:    Patient is feeling unchanged. Suicidal ideation:  denies suicidal ideation.  Patient does not have medication side effects.    ROS: Patient has new complaints: no  Sleeping adequately:  Yes   Appetite adequate: Yes  Attending groups: No:   Visitors:No    OBJECTIVE    Physical  VITALS:  /66   Pulse 66   Temp 97.8 °F (36.6 °C) (Oral)   Resp 14   Ht 1.702 m (5' 7.01\")   Wt 50.3 kg (110 lb 14.4 oz) Comment: 110.5  SpO2 99%   BMI 17.37 kg/m²     Mental Status Examination:  Patients appearance was ill-appearing. Thoughts are Illogical. Homicidal ideations none.  No abnormal movements, tics or mannerisms.  Memory impaired Aims 0. Concentration Poor.   Alert and oriented X 4. Insight and Judgement delusions. Patient was cooperative. Patient gait abnormal. Mood constricted, affect flat affect Hallucinations present, suicidal ideations no specific plan to harm self Speech normal volume  Data  Labs:   Admission on 2024   Component Date Value Ref Range Status    Color, UA 2024 Straw  Straw/Yellow Final    Clarity, UA 2024 Clear  Clear Final    Glucose, Ur 2024 Negative  Negative mg/dL Final    Bilirubin, Urine 2024 Negative  Negative Final    Ketones, Urine 2024 Negative  Negative mg/dL Final    Specific Gravity, UA 2024 <=1.005  1.005 - 1.030 
Department of Psychiatry  AttendingProgress Note  Chief Complaint: julio c Medina was on unit. She appears irritable and labile. She talked about how her daughter lives with her. She is not able to give a clear history as to why she is here. She continues to believe that there are babies in her room. She said that there are now more I her room . 7 total. She's not sure how they got there and doesn't appear distressed about it.   Will complete expert eval as daughter will apply for guardianship.    Patient's chart was reviewed and collaborated with  about the treatment plan.  SUBJECTIVE:    Patient is feeling unchanged. Suicidal ideation:  denies suicidal ideation.  Patient does not have medication side effects.    ROS: Patient has new complaints: no  Sleeping adequately:  Yes   Appetite adequate: Yes  Attending groups: Yes  Visitors:Yes    OBJECTIVE    Physical  VITALS:  /64   Pulse 65   Temp 98.4 °F (36.9 °C) (Temporal)   Resp 16   Ht 1.702 m (5' 7.01\")   Wt 50.3 kg (110 lb 14.4 oz) Comment: 110.5  SpO2 100%   BMI 17.37 kg/m²     Mental Status Examination:  Patients appearance was ill-appearing. Thoughts are Illogical. Homicidal ideations none.  No abnormal movements, tics or mannerisms.  Memory impaired Aims 0. Concentration Poor.   Alert and oriented X 4. Insight and Judgement delusions. Patient was cooperative. Patient gait normal. Mood labile, affect labile affect Hallucinations present, suicidal ideations no specific plan to harm self Speech normal volume  Data  Labs:   Admission on 07/03/2024   Component Date Value Ref Range Status    Color, UA 07/03/2024 Straw  Straw/Yellow Final    Clarity, UA 07/03/2024 Clear  Clear Final    Glucose, Ur 07/03/2024 Negative  Negative mg/dL Final    Bilirubin, Urine 07/03/2024 Negative  Negative Final    Ketones, Urine 07/03/2024 Negative  Negative mg/dL Final    Specific Gravity, UA 07/03/2024 <=1.005  1.005 - 1.030 Final    Blood, Urine 
Department of Psychiatry  AttendingProgress Note  Chief Complaint: mood lability   Carol has been stable . She is more logical and does not appear as scattered. She is excited to go to assisted living  and hopefully [facility]. No med changes  Patient's chart was reviewed and collaborated with  about the treatment plan.  SUBJECTIVE:    Patient is feeling better. Suicidal ideation:  denies suicidal ideation.  Patient does not have medication side effects.    ROS: Patient has new complaints: no  Sleeping adequately:  Yes   Appetite adequate: Yes  Attending groups: Yes  Visitors:No    OBJECTIVE    Physical  VITALS:  /69   Pulse 85   Temp 98.1 °F (36.7 °C) (Temporal)   Resp 19   Ht 1.702 m (5' 7.01\")   Wt 50.3 kg (110 lb 14.4 oz) Comment: 110.5  SpO2 98%   BMI 17.37 kg/m²     Mental Status Examination:  Patients appearance was ill-appearing. Thoughts are Goal directed. Homicidal ideations none.  No abnormal movements, tics or mannerisms.  Memory intact Aims 0. Concentration Fair.   Alert and oriented X 4. Insight and Judgement impaired insight. Patient was cooperative. Patient gait normal. Mood irritable, affect labile affect Hallucinations Absent, suicidal ideations no specific plan to harm self Speech normal volume  Data  Labs:   Admission on 07/03/2024   Component Date Value Ref Range Status    Color, UA 07/03/2024 Straw  Straw/Yellow Final    Clarity, UA 07/03/2024 Clear  Clear Final    Glucose, Ur 07/03/2024 Negative  Negative mg/dL Final    Bilirubin, Urine 07/03/2024 Negative  Negative Final    Ketones, Urine 07/03/2024 Negative  Negative mg/dL Final    Specific Gravity, UA 07/03/2024 <=1.005  1.005 - 1.030 Final    Blood, Urine 07/03/2024 Negative  Negative Final    pH, Urine 07/03/2024 7.0  5.0 - 8.0 Final    Protein, UA 07/03/2024 Negative  Negative mg/dL Final    Urobilinogen, Urine 07/03/2024 0.2  <2.0 E.U./dL Final    Nitrite, Urine 07/03/2024 Negative  Negative Final    
Department of Psychiatry  Progress Note    Patient's chart was reviewed. Discussed with treatment team. Met with patient.     SUBJECTIVE:      Overall doing better.    No-longer voicing or endorsing psychotic symptoms.    Cognition has improved too:  Scored 25/30 on MMSE today with deficits in recall.     Is behaviorally stable.     Hopes to go home.     ROS:   Patient has new complaints: no  Sleeping adequately:  Yes   Appetite adequate: Yes  Engaged in programming: No:    OBJECTIVE:  VITALS:  /61   Pulse 72   Temp 97.8 °F (36.6 °C) (Temporal)   Resp 16   Ht 1.702 m (5' 7.01\")   Wt 50.3 kg (110 lb 14.4 oz) Comment: 110.5  SpO2 98%   BMI 17.37 kg/m²     Mental Status Examination:    Appearance: fair grooming and hygiene. Some TD-like movements. Otherwise, not parkinsonian.   Behavior/Attitude toward examiner:  cooperative, attentive and fair eye contact  Speech: Normal rate, volume, amount  Mood:  \"better\"  Affect:  blunted     Thought processes:  Goal directed, linear, no DAVE or gross disorganization  Thought Content: no SI, no HI, no delusions voiced, no obsessions  Perceptions: no AVH. Not RTIS  Attention: attention span and concentration were intact to interview   Abstraction: intact  Cognition:  mild impairment.   Insight: fair  Judgment: fair    Medication:  Scheduled:   lidocaine  1 patch TransDERmal Daily    perphenazine  4 mg Oral Nightly    venlafaxine  150 mg Oral QAM    ARIPiprazole  5 mg Oral QHS    nicotine  1 patch TransDERmal Daily    vitamin D  50,000 Units Oral Weekly    donepezil  10 mg Oral Daily    rOPINIRole  4 mg Oral Nightly    atorvastatin  20 mg Oral Daily    benztropine  1 mg Oral BID    aspirin  81 mg Oral Daily    metoprolol succinate  25 mg Oral Daily        PRN:  acetaminophen, loperamide, magnesium hydroxide, nicotine polacrilex, aluminum & magnesium hydroxide-simethicone, OLANZapine **OR** OLANZapine (ZyPREXA) 10 mg in sterile water 2 mL injection, diphenhydrAMINE, 
Inpatient Occupational Therapy Evaluation and Treatment and Discharge Summary    Unit: [facility]  Date:  7/6/2024  Patient Name:    Carol Wagoner  Admitting diagnosis:  Confusion [R41.0]  Failure to thrive in adult [R62.7]  Dementia without behavioral disturbance (HCC) [F03.90]  Dementia, unspecified dementia severity, unspecified dementia type, unspecified whether behavioral, psychotic, or mood disturbance or anxiety (HCC) [F03.90]  Admit Date:  7/3/2024  Precautions/Restrictions/WB Status/ Lines/ Wounds/ Oxygen: Fall risk and Standard BHI Precautions    Treatment Time:  10:05-10:42  Treatment Number:  1  Timed Code Treatment Minutes: 27 minutes  Total Treatment Minutes: 37 minutes    Patient Goals for Therapy: none stated      Discharge Recommendations: Defer to BHI team due to cognition  DME needs for discharge: Needs Met       Therapy recommendations for staff:   Independent for ambulation with use of No AD within community room    History of Present Illness: Per H&P  \"Patient seen in room on Adult Behavioral Unit.   Patient is a 59 y.o. female who presented to the ED at [facility].   She is unable to give a logical and coherent history as to why she is here.  Reviewed notes from ED below        59 y.o.  female who presents for psychiatric evaluation. Patient presented to the ED on 07/04/24 from home by daughter. Per ED Documentation: \" Patient reported to have dementia.  Over the last 2 weeks patient is not safe being home.  Reported to have drink , dog warmers, and cooking food on stove without a pan. Patient needs placement.\"     At time of assessment patient is calm and cooperative.  Patient's speech and behaviors are disorganized.  Patient's thought process is circumstantial and often tangential.  Patient's memory is impaired including immediate, recent, and remote.  Patient was able to tell me her first name and states her last name is \"M[place]\", writer clarifies if Ciaran is her 
Inpatient Occupational Therapy Re-Evaluation    Unit: [facility]  Date:  2024  Patient Name:    Carol Wagoner  Admitting diagnosis:  Confusion [R41.0]  Failure to thrive in adult [R62.7]  Dementia without behavioral disturbance (HCC) [F03.90]  Dementia, unspecified dementia severity, unspecified dementia type, unspecified whether behavioral, psychotic, or mood disturbance or anxiety (HCC) [F03.90]  Admit Date:  7/3/2024  Precautions/Restrictions/WB Status/ Lines/ Wounds/ Oxygen: Confusion and Standard [facility] Precautions    Treatment Time:    Treatment Number:  1  Timed Code Treatment Minutes: 25 minutes  Total Treatment Minutes:  35  minutes    Patient Goals for Therapy: none stated          Discharge Recommendations: 24 hr supervision secondary to decreased cogniton/safety awareness  DME needs for discharge: Needs Met       Therapy recommendations for staff:   Supervision for ambulation with use of No AD within room  within halls  within community room    History of Present Illness: Per H&P by ARVIND Monte: \" 59 y.o. female with pmhx of anxiety, bipolar disorder,CAD,dementia, HTN who presented to [facility] for inability to care for self, increasing confusion.  Patient was seen and evaluated in the ED by the ED medical provider, patient was medically cleared for admission to [facility] at [facility].  This note serves as an admission medical H&P.\"     AM-PAC Score: AM-PAC Inpatient Daily Activity Raw Score: 23     Subjective:  Patient  ambulating in community room  with hospital staff in room.   Pt agreeable to this OT session.     Cognition:    A&O x4   Able to follow 1 step commands    Pain:   Yes  Location: neck   Ratin /10  Pain Medicine Status: Received pain med prior to tx    Activity Tolerance:   Pt completed therapy session with no adverse symptoms     BP (mmHg) HR (bpm) SpO2 (%) on RA Comments   Supine at rest       Seated at EOB       Standing       End of session           Preadmission Environment: 
Notified charge RN of need for EKG stat.   
PRN Atarax given for increasing anxiety. Pt states all needs are met at this time.  
PRN Tylenol given at 0840 for bilateral knee pain.  
Patient awake and anxious at 22:42. Patient complained of neck pain and inability to sleep. Patient given Motrin 400 mg , Xanax 0.5 mg, and Trazodone 50 mg. Patient currently rests with eyes closed. Calm expression present. Respirations regular and even. Positive effects noted with use of Xanax, Motrin, and Trazodone.   
Patient given Trazodone 50 mg at 21:59. Remains awake with no positive effects noted with use. Patient remains calm and pleasant with interaction.   
Patient is awake at this time, states anxiety is better. Patient given Trazodone 50 mg at this time related to not sleeping.    
Patient noted with eyes closed. Calm expression at 22:45. Patient slept through most of night. Positive effects noted with use of Xanax 0.5 mg and Trazodone 50 mg.   
Patient rests with eyes closed. Calm expression. Respirations regular and even. Positive effects of Trazodone noted.   
Patient slept from midnight until this time. Patient asks about, \"[employer] and if they owners got arrested last night\". Speaks of a meth lab. Patient returned to room. Calm this morning.   
Patient stated she might be starting to feel anxious. PRN Atarax given and a refill on a drink. Patient visible on unit, walking the hallway.  
Patient tearful this am walking around in flowers looking for granddaughter. Stated that she couldn't believe that she had lost her and she had just seen and heard her in her room. Writer assured patient that there were no children on the unit and that her granddaughter was home with her family. Patient covers eyes with hands and states why do I do this? Emotional support given to patient. Patient up ad ekaterina on unit. Gait slow and steady. Has spent much of the day on the phone with family members. Patient on the phone talking with daughter and telling her that granddaughter had spent the night her with her. Patient observed in room talking to self at times. Cooperative with care, took medications and appetite good with fluids taken well.  
Patient unable to sleep and having pain in neck. PRN zanaflex and trazodone given.  
Patient unable to sleep and stated that she was seeing a baby girl in her bed. One time zyprexa was ineffective. Dr. Shea informed, one time dose of ativan 1mg ordered and given via telephone with readback. Ativan effective  
Patient was friendly and cooperative. A&O to self and time. Patient has disorganized speech intermittently and disconnected topics in conversations. Patient visible on unit, talking with peers and staff. PRN zyprexa was given for anxiety and promote sleep.  Patient confirmed to having hallucinations but would not elaborate with this nurse.  
Patient was seeing a baby in her bed and was very tearful when talking about it. Emotional support provided. Dr mcallister ordered a one time dose of zyprexa 5 mg via telephone with readback  
Patient was tearful and restless at beginning of shift. Patient was having hallucinations about her grandson dying in the . Patient called daughter to talk about grandson. This nurse and daughter tried to redirect patient that grandson was okay. Patient believed that her kids where here to pick her up. Patient was redirectable with some irritability noted. Patient became upset with this nurse when she couldn't leave unit. RTIS noted. Patient was highly anxious. PRN atarax and zyprexa were given. Anxiety and RTIS improved as evidenced by calmer and patient not talking to self anymore.   AVH noted. Patient having pain in neck. PRN Zanaflex given. Will continue to monitor.  
Prn xanax and tylenol given at 1406 with positive effects for anxiety and HA.   
Pt agitated and upset that she can't go outside to smoke a cigarette. Explained that she was in the hospital and it was a non-smoking facility. Offered to get a nicotine patch or nicotine gum for pt. Pt refused both. Shirley Iqbal RN    
Pt back to room from CT. EKG completed by tech. Pt less agitated at this time. Bed alarm turned on for fall risk.  Shirley Iqbal RN    
   Nitrite, Urine 07/03/2024 Negative  Negative Final    Leukocyte Esterase, Urine 07/03/2024 Negative  Negative Final    Microscopic Examination 07/03/2024 Not Indicated   Final    Urine Type 07/03/2024 NotGiven   Final    Urine received in a container without preservatives.    Urine Reflex to Culture 07/03/2024 Not Indicated   Final    WBC 07/03/2024 7.7  4.0 - 11.0 K/uL Final    RBC 07/03/2024 4.50  4.00 - 5.20 M/uL Final    Hemoglobin 07/03/2024 14.0  12.0 - 16.0 g/dL Final    Hematocrit 07/03/2024 40.9  36.0 - 48.0 % Final    MCV 07/03/2024 90.8  80.0 - 100.0 fL Final    MCH 07/03/2024 31.1  26.0 - 34.0 pg Final    MCHC 07/03/2024 34.3  31.0 - 36.0 g/dL Final    RDW 07/03/2024 13.6  12.4 - 15.4 % Final    Platelets 07/03/2024 214  135 - 450 K/uL Final    MPV 07/03/2024 9.5  5.0 - 10.5 fL Final    Neutrophils % 07/03/2024 52.1  % Final    Lymphocytes % 07/03/2024 36.1  % Final    Monocytes % 07/03/2024 6.9  % Final    Eosinophils % 07/03/2024 4.2  % Final    Basophils % 07/03/2024 0.7  % Final    Neutrophils Absolute 07/03/2024 4.0  1.7 - 7.7 K/uL Final    Lymphocytes Absolute 07/03/2024 2.8  1.0 - 5.1 K/uL Final    Monocytes Absolute 07/03/2024 0.5  0.0 - 1.3 K/uL Final    Eosinophils Absolute 07/03/2024 0.3  0.0 - 0.6 K/uL Final    Basophils Absolute 07/03/2024 0.1  0.0 - 0.2 K/uL Final    Sodium 07/03/2024 140  136 - 145 mmol/L Final    Potassium reflex Magnesium 07/03/2024 3.4 (L)  3.5 - 5.1 mmol/L Final    Chloride 07/03/2024 102  99 - 110 mmol/L Final    CO2 07/03/2024 31  21 - 32 mmol/L Final    Anion Gap 07/03/2024 7  3 - 16 Final    Glucose 07/03/2024 97  70 - 99 mg/dL Final    BUN 07/03/2024 11  7 - 20 mg/dL Final    Creatinine 07/03/2024 0.8  0.6 - 1.2 mg/dL Final    Est, Glom Filt Rate 07/03/2024 84  >60 Final    Comment: Pediatric calculator link  https://www.kidney.org/professionals/kdoqi/gfr_calculatorped  Effective Oct 3, 2022  These results are not intended for use in patients  <18 years of age. 
(HCC)    Failure to thrive in adult  Resolved Problems:    * No resolved hospital problems. *       1.Patient s symptoms   show no change  2.Probable discharge is undetermined  3.Discharge planning is incomplete  4. Suicidal ideation is  absent    Total time with patient was 50 minutes and more than 50 % of that time was spent counseling the patient on their symptoms, treatment, and expected goals.       Parish Shea MD  
Ethanol Lvl 07/03/2024 None Detected  mg/dL Final    Comment:    None Detected  Conversion factor:  100 mg/dl = .100 g/dl  For Medical Purposes Only      Acetaminophen Level 07/03/2024 <5 (L)  10 - 30 ug/mL Final    Comment: Therapeutic Range: 10.0-30.0 ug/mL  Toxic: >=150 ug/mL      Salicylate Lvl 07/03/2024 <0.3 (L)  15.0 - 30.0 mg/dL Final    Comment: Therapeutic Range: 15.0-30.0 mg/dL  Toxic: >30.0 mg/dL      Magnesium 07/03/2024 2.00  1.80 - 2.40 mg/dL Final    Ventricular Rate 07/04/2024 60  BPM Final    Atrial Rate 07/04/2024 60  BPM Final    P-R Interval 07/04/2024 176  ms Final    QRS Duration 07/04/2024 92  ms Final    Q-T Interval 07/04/2024 446  ms Final    QTc Calculation (Bazett) 07/04/2024 446  ms Final    P Axis 07/04/2024 83  degrees Final    R Axis 07/04/2024 78  degrees Final    T [place] 07/04/2024 79  degrees Final    Diagnosis 07/04/2024 Normal sinus rhythmNonspecific ST abnormalityAbnormal ECGNo previous ECGs availableConfirmed by RA LUNA ([id number]) on 7/4/2024 12:36:51 PM   Final    Vit D, 25-Hydroxy 07/03/2024 24.3 (L)  >=30 ng/mL Final    Comment: <=20 ng/mL.............Deficient  21-29 ng/mL...........Insufficient  >=30 ng/mL..........Sufficient              Medications  Current Facility-Administered Medications: vitamin D (ERGOCALCIFEROL) capsule 50,000 Units, 50,000 Units, Oral, Weekly  acetaminophen (TYLENOL) tablet 650 mg, 650 mg, Oral, Q4H PRN  ibuprofen (ADVIL;MOTRIN) tablet 400 mg, 400 mg, Oral, Q6H PRN  magnesium hydroxide (MILK OF MAGNESIA) 400 MG/5ML suspension 30 mL, 30 mL, Oral, Daily PRN  nicotine polacrilex (COMMIT) lozenge 2 mg, 2 mg, Oral, Q1H PRN  aluminum & magnesium hydroxide-simethicone (MAALOX) 200-200-20 MG/5ML suspension 30 mL, 30 mL, Oral, Q6H PRN  OLANZapine (ZYPREXA) tablet 5 mg, 5 mg, Oral, Q4H PRN **OR** OLANZapine (ZyPREXA) 10 mg in sterile water 2 mL injection, 10 mg, IntraMUSCular, Q4H PRN  diphenhydrAMINE (BENADRYL) injection 50 mg, 50 mg, IntraMUSCular, 
accurate in patients with  extremes of muscle mass, extra-renal metabolism of  creatinine, excessive creatinine ingestion, or following  therapy that affects renal tubular secretion.      Calcium 07/11/2024 9.1  8.3 - 10.6 mg/dL Final    WBC 07/11/2024 8.1  4.0 - 11.0 K/uL Final    RBC 07/11/2024 4.34  4.00 - 5.20 M/uL Final    Hemoglobin 07/11/2024 13.5  12.0 - 16.0 g/dL Final    Hematocrit 07/11/2024 40.3  36.0 - 48.0 % Final    MCV 07/11/2024 92.8  80.0 - 100.0 fL Final    MCH 07/11/2024 31.2  26.0 - 34.0 pg Final    MCHC 07/11/2024 33.6  31.0 - 36.0 g/dL Final    RDW 07/11/2024 13.9  12.4 - 15.4 % Final    Platelets 07/11/2024 198  135 - 450 K/uL Final    MPV 07/11/2024 10.4  5.0 - 10.5 fL Final    Neutrophils % 07/11/2024 64.8  % Final    Lymphocytes % 07/11/2024 23.4  % Final    Monocytes % 07/11/2024 5.4  % Final    Eosinophils % 07/11/2024 6.1  % Final    Basophils % 07/11/2024 0.3  % Final    Neutrophils Absolute 07/11/2024 5.3  1.7 - 7.7 K/uL Final    Lymphocytes Absolute 07/11/2024 1.9  1.0 - 5.1 K/uL Final    Monocytes Absolute 07/11/2024 0.4  0.0 - 1.3 K/uL Final    Eosinophils Absolute 07/11/2024 0.5  0.0 - 0.6 K/uL Final    Basophils Absolute 07/11/2024 0.0  0.0 - 0.2 K/uL Final            Medications  Current Facility-Administered Medications: acetaminophen (TYLENOL) tablet 650 mg, 650 mg, Oral, Q6H PRN  lidocaine 4 % external patch 1 patch, 1 patch, TransDERmal, Daily  perphenazine tablet 4 mg, 4 mg, Oral, Nightly  loperamide (IMODIUM) capsule 2 mg, 2 mg, Oral, Q6H PRN  venlafaxine (EFFEXOR XR) extended release capsule 150 mg, 150 mg, Oral, QAM  ARIPiprazole (ABILIFY) tablet 5 mg, 5 mg, Oral, QHS  nicotine (NICODERM CQ) 14 MG/24HR 1 patch, 1 patch, TransDERmal, Daily  vitamin D (ERGOCALCIFEROL) capsule 50,000 Units, 50,000 Units, Oral, Weekly  magnesium hydroxide (MILK OF MAGNESIA) 400 MG/5ML suspension 30 mL, 30 mL, Oral, Daily PRN  nicotine polacrilex (COMMIT) lozenge 2 mg, 2 mg, Oral, Q1H 
previous equations.  The CKD-EPI equation is less accurate in patients with  extremes of muscle mass, extra-renal metabolism of  creatinine, excessive creatinine ingestion, or following  therapy that affects renal tubular secretion.      Calcium 07/11/2024 9.1  8.3 - 10.6 mg/dL Final    WBC 07/11/2024 8.1  4.0 - 11.0 K/uL Final    RBC 07/11/2024 4.34  4.00 - 5.20 M/uL Final    Hemoglobin 07/11/2024 13.5  12.0 - 16.0 g/dL Final    Hematocrit 07/11/2024 40.3  36.0 - 48.0 % Final    MCV 07/11/2024 92.8  80.0 - 100.0 fL Final    MCH 07/11/2024 31.2  26.0 - 34.0 pg Final    MCHC 07/11/2024 33.6  31.0 - 36.0 g/dL Final    RDW 07/11/2024 13.9  12.4 - 15.4 % Final    Platelets 07/11/2024 198  135 - 450 K/uL Final    MPV 07/11/2024 10.4  5.0 - 10.5 fL Final    Neutrophils % 07/11/2024 64.8  % Final    Lymphocytes % 07/11/2024 23.4  % Final    Monocytes % 07/11/2024 5.4  % Final    Eosinophils % 07/11/2024 6.1  % Final    Basophils % 07/11/2024 0.3  % Final    Neutrophils Absolute 07/11/2024 5.3  1.7 - 7.7 K/uL Final    Lymphocytes Absolute 07/11/2024 1.9  1.0 - 5.1 K/uL Final    Monocytes Absolute 07/11/2024 0.4  0.0 - 1.3 K/uL Final    Eosinophils Absolute 07/11/2024 0.5  0.0 - 0.6 K/uL Final    Basophils Absolute 07/11/2024 0.0  0.0 - 0.2 K/uL Final            Medications  Current Facility-Administered Medications: acetaminophen (TYLENOL) tablet 650 mg, 650 mg, Oral, Q6H PRN  lidocaine 4 % external patch 1 patch, 1 patch, TransDERmal, Daily  perphenazine tablet 4 mg, 4 mg, Oral, Nightly  loperamide (IMODIUM) capsule 2 mg, 2 mg, Oral, Q6H PRN  venlafaxine (EFFEXOR XR) extended release capsule 150 mg, 150 mg, Oral, QAM  ARIPiprazole (ABILIFY) tablet 5 mg, 5 mg, Oral, QHS  nicotine (NICODERM CQ) 14 MG/24HR 1 patch, 1 patch, TransDERmal, Daily  vitamin D (ERGOCALCIFEROL) capsule 50,000 Units, 50,000 Units, Oral, Weekly  magnesium hydroxide (MILK OF MAGNESIA) 400 MG/5ML suspension 30 mL, 30 mL, Oral, Daily PRN  nicotine 
without  a race factor using the 2021 CKD-EPI equation.  Careful  clinical correlation is recommended, particularly when  comparing to results calculated using previous equations.  The CKD-EPI equation is less accurate in patients with  extremes of muscle mass, extra-renal metabolism of  creatinine, excessive creatinine ingestion, or following  therapy that affects renal tubular secretion.      Calcium 07/11/2024 9.1  8.3 - 10.6 mg/dL Final    WBC 07/11/2024 8.1  4.0 - 11.0 K/uL Final    RBC 07/11/2024 4.34  4.00 - 5.20 M/uL Final    Hemoglobin 07/11/2024 13.5  12.0 - 16.0 g/dL Final    Hematocrit 07/11/2024 40.3  36.0 - 48.0 % Final    MCV 07/11/2024 92.8  80.0 - 100.0 fL Final    MCH 07/11/2024 31.2  26.0 - 34.0 pg Final    MCHC 07/11/2024 33.6  31.0 - 36.0 g/dL Final    RDW 07/11/2024 13.9  12.4 - 15.4 % Final    Platelets 07/11/2024 198  135 - 450 K/uL Final    MPV 07/11/2024 10.4  5.0 - 10.5 fL Final    Neutrophils % 07/11/2024 64.8  % Final    Lymphocytes % 07/11/2024 23.4  % Final    Monocytes % 07/11/2024 5.4  % Final    Eosinophils % 07/11/2024 6.1  % Final    Basophils % 07/11/2024 0.3  % Final    Neutrophils Absolute 07/11/2024 5.3  1.7 - 7.7 K/uL Final    Lymphocytes Absolute 07/11/2024 1.9  1.0 - 5.1 K/uL Final    Monocytes Absolute 07/11/2024 0.4  0.0 - 1.3 K/uL Final    Eosinophils Absolute 07/11/2024 0.5  0.0 - 0.6 K/uL Final    Basophils Absolute 07/11/2024 0.0  0.0 - 0.2 K/uL Final            Medications  Current Facility-Administered Medications: venlafaxine (EFFEXOR XR) extended release capsule 150 mg, 150 mg, Oral, QAM  perphenazine tablet 2 mg, 2 mg, Oral, Nightly  ARIPiprazole (ABILIFY) tablet 5 mg, 5 mg, Oral, QHS  nicotine (NICODERM CQ) 14 MG/24HR 1 patch, 1 patch, TransDERmal, Daily  vitamin D (ERGOCALCIFEROL) capsule 50,000 Units, 50,000 Units, Oral, Weekly  acetaminophen (TYLENOL) tablet 650 mg, 650 mg, Oral, Q4H PRN  ibuprofen (ADVIL;MOTRIN) tablet 400 mg, 400 mg, Oral, Q6H 
(COMMIT) lozenge 2 mg, 2 mg, Oral, Q1H PRN  aluminum & magnesium hydroxide-simethicone (MAALOX) 200-200-20 MG/5ML suspension 30 mL, 30 mL, Oral, Q6H PRN  OLANZapine (ZYPREXA) tablet 5 mg, 5 mg, Oral, Q4H PRN **OR** OLANZapine (ZyPREXA) 10 mg in sterile water 2 mL injection, 10 mg, IntraMUSCular, Q4H PRN  diphenhydrAMINE (BENADRYL) injection 50 mg, 50 mg, IntraMUSCular, Q4H PRN  traZODone (DESYREL) tablet 50 mg, 50 mg, Oral, Nightly PRN  albuterol sulfate HFA (PROVENTIL;VENTOLIN;PROAIR) 108 (90 Base) MCG/ACT inhaler 1 puff, 1 puff, Inhalation, Q4H PRN  donepezil (ARICEPT) tablet 10 mg, 10 mg, Oral, Daily  rOPINIRole (REQUIP) tablet 4 mg, 4 mg, Oral, Nightly  atorvastatin (LIPITOR) tablet 20 mg, 20 mg, Oral, Daily  tiZANidine (ZANAFLEX) tablet 4 mg, 4 mg, Oral, Q8H PRN  benztropine (COGENTIN) tablet 1 mg, 1 mg, Oral, BID  ALPRAZolam (XANAX) tablet 0.5 mg, 0.5 mg, Oral, TID PRN  hydrOXYzine HCl (ATARAX) tablet 25 mg, 25 mg, Oral, TID PRN  aspirin chewable tablet 81 mg, 81 mg, Oral, Daily  metoprolol succinate (TOPROL XL) extended release tablet 25 mg, 25 mg, Oral, Daily    ASSESSMENT AND PLAN    Principal Problem:    Dementia (HCC)  Active Problems:    Acute encephalopathy    Severe protein-calorie malnutrition (HCC)    Failure to thrive in adult  Resolved Problems:    * No resolved hospital problems. *       1.Patient s symptoms   show no change  2.Probable discharge is next week  3.Discharge planning is incomplete  4. Suicidal ideation is  none  5. Total time with patient was 50 minutes and more than 50 % of that time was spent counseling the patient on their symptoms, treatment and expected goals.        Eben Hebert MD  Physician Psychiatry       
Oral, Q1H PRN  aluminum & magnesium hydroxide-simethicone (MAALOX) 200-200-20 MG/5ML suspension 30 mL, 30 mL, Oral, Q6H PRN  OLANZapine (ZYPREXA) tablet 5 mg, 5 mg, Oral, Q4H PRN **OR** OLANZapine (ZyPREXA) 10 mg in sterile water 2 mL injection, 10 mg, IntraMUSCular, Q4H PRN  diphenhydrAMINE (BENADRYL) injection 50 mg, 50 mg, IntraMUSCular, Q4H PRN  traZODone (DESYREL) tablet 50 mg, 50 mg, Oral, Nightly PRN  albuterol sulfate HFA (PROVENTIL;VENTOLIN;PROAIR) 108 (90 Base) MCG/ACT inhaler 1 puff, 1 puff, Inhalation, Q4H PRN  donepezil (ARICEPT) tablet 10 mg, 10 mg, Oral, Daily  rOPINIRole (REQUIP) tablet 4 mg, 4 mg, Oral, Nightly  atorvastatin (LIPITOR) tablet 20 mg, 20 mg, Oral, Daily  tiZANidine (ZANAFLEX) tablet 4 mg, 4 mg, Oral, Q8H PRN  benztropine (COGENTIN) tablet 1 mg, 1 mg, Oral, BID  ALPRAZolam (XANAX) tablet 0.5 mg, 0.5 mg, Oral, TID PRN  hydrOXYzine HCl (ATARAX) tablet 25 mg, 25 mg, Oral, TID PRN  aspirin chewable tablet 81 mg, 81 mg, Oral, Daily  metoprolol succinate (TOPROL XL) extended release tablet 25 mg, 25 mg, Oral, Daily    ASSESSMENT AND PLAN    Principal Problem:    Dementia (HCC)  Active Problems:    Acute encephalopathy    Severe protein-calorie malnutrition (HCC)    Failure to thrive in adult  Resolved Problems:    * No resolved hospital problems. *       1.Patient s symptoms   are improving  2.Probable discharge is next week  3.Discharge planning is incomplete  4. Suicidal ideation is  none  5. Total time with patient was 50 minutes and more than 50 % of that time was spent counseling the patient on their symptoms, treatment and expected goals.          Eben Hebert MD  Physician Psychiatry

## 2024-07-23 NOTE — BH NOTE
Bridge Appointment completed: Reviewed Discharge Instructions with patient.    Patient verbalizes understanding and agreement with the discharge plan using the teachback method.     Referral for Outpatient Tobacco Cessation Counseling, upon discharge (azael X if applicable and completed):    ( )  Hospital staff assisted patient to call Quit Line or faxed referral                                   during hospitalization                  ( )  Recognizing danger situations (included triggers and roadblocks), if not completed on admission                    ( )  Coping skills (new ways to manage stress, exercise, relaxation techniques, changing routine, distraction), if not completed on admission                                                           ( )  Basic information about quitting (benefits of quitting, techniques in how to quit, available resources, if not completed on admission  ( ) [facility]   (x ) Patient refused referral  ( x) Patient refused counseling  (x ) Patient refused smoking cessation medication upon discharge    Vaccinations (azael X if applicable and completed):  ( ) Patient states already received influenza vaccine elsewhere  ( ) Patient received influenza vaccine during this hospitalization  ( x) Patient refused influenza vaccine at this time

## 2024-07-23 NOTE — BH NOTE
Contacted Pt's niece (Christy) [phone]  regarding name of home health care company. Also, attempted to confirm name of Pt's psychiatrist and PCP with niece.  Niece suggested writer contact daughter (Angela) regarding name of Pt's psychiatrist and PCP. Niece provided 's name Ambar Cruz at [facility] at [phone].    Phone call to Janay Cruz regarding Pt's services and informed her that Pt was being discharged to home today. CM asked if daughter was aware that Pt was being discharged to home instead of a facility. Explained the reason Pt was being discharged to home as well as confirmed daughter had not been notified of discharge plan at the time of call. CM stated Pt receives personal care services, home delivered meals and emergency response button.  stated she will contact daughter regarding discharge to home. Fax number is [phone].    Phone call to daughter Crescencio) at [phone]. No answer, message stated voice mail was full.    Rec'd call from niece. Niece questioned whether pt's daughter was aware Pt was being discharged to home today. Explained daughter's vm was full, unable to contact. Niece also stated Pt has 10-15 hours of personal care services per week.      Indaina Walton, Social Work Intern, [facility]    Reviewed by ED Hickey

## 2024-07-23 NOTE — BH NOTE
[facility]  Discharge Note    Pt discharged with followings belongings:   Dental Appliances: None  Vision - Corrective Lenses: None  Hearing Aid: None  Jewelry: Ring (3 rings w/ pt)  Body Piercings Removed: N/A  Clothing: Footwear, Pants, Shirt, Shorts, Slippers, Socks, Undergarments (4 shirts, 2 jeans, 1 hector short)  Other Valuables: Other (Comment) (shower toiletries, deodorant, denture tablets, brush, word search book, crossword book)   Valuables sent home with patient or returned to patient. Patient educated on aftercare instructions: yes  Information faxed to no fax needed by writer  at 5:41 PM .Patient verbalize understanding of AVS:  yes.    Status EXAM upon discharge:  Mental Status and Behavioral Exam  Normal: No  Level of Assistance: Independent/Self  Facial Expression: Brightened  Affect: Congruent  Level of Consciousness: Alert  Frequency of Checks: 4 times per hour, close  Mood:Normal: No  Mood: Anxious  Motor Activity:Normal: Yes  Motor Activity: Decreased  Eye Contact: Good  Observed Behavior: Cooperative, Friendly  Sexual Misconduct History: Current - no  Preception: [place] to person, [place] to time, [place] to place  Attention:Normal: Yes  Attention: Distractible  Thought Processes: Circumstantial  Thought Content:Normal: Yes  Thought Content: Preoccupations  Depression Symptoms: Isolative  Anxiety Symptoms: Generalized  Martha Symptoms: No problems reported or observed.  Hallucinations: None  Delusions: No  Delusions: Paranoid  Memory:Normal: No  Memory: Poor recent  Insight and Judgment: No  Insight and Judgment: Poor judgment, Poor insight    Tobacco Screening:  Practical Counseling, on admission, azael X, if applicable and completed (first 3 are required if patient doesn't refuse):            ( ) Recognizing danger situations (included triggers and roadblocks)                    ( ) Coping skills (new ways to manage stress,relaxation techniques, changing routine, distraction)

## 2024-07-23 NOTE — PLAN OF CARE
Problem: Behavior  Goal: Pt/Family maintain appropriate behavior and adhere to behavioral management agreement, if implemented  Description: INTERVENTIONS:  1. Assess patient/family's coping skills and  non-compliant behavior (including use of illegal substances)  2. Notify security of behavior or suspected illegal substances which indicate the need for search of the family and/or belongings  3. Encourage verbalization of thoughts and concerns in a socially appropriate manner  4. Utilize positive, consistent limit setting strategies supporting safety of patient, staff and others  5. Encourage participation in the decision making process about the behavioral management agreement  6. If a visitor's behavior poses a threat to safety call refer to organization policy.  7. Initiate consult with , Psychosocial CNS, Spiritual Care as appropriate  Outcome: Cori Medina was visible and social in the milieu today.  She ate all meals in the day room.  Carol was compliant with medications, the Effexor was not available until later in the day.       Problem: Psychosis  Goal: Will report no hallucinations or delusions  Description: INTERVENTIONS:  1. Administer medication as  ordered  2. Assist with reality testing to support increasing orientation  3. Assess if patient's hallucinations or delusions are encouraging self harm or harm to others and intervene as appropriate  Outcome: Cori Medina denied suicidal and homicidal ideation.  She denied hallucinations, stating that she has not been hearing voices for several days.    
  Problem: Behavior  Goal: Pt/Family maintain appropriate behavior and adhere to behavioral management agreement, if implemented  Description: INTERVENTIONS:  1. Assess patient/family's coping skills and  non-compliant behavior (including use of illegal substances)  2. Notify security of behavior or suspected illegal substances which indicate the need for search of the family and/or belongings  3. Encourage verbalization of thoughts and concerns in a socially appropriate manner  4. Utilize positive, consistent limit setting strategies supporting safety of patient, staff and others  5. Encourage participation in the decision making process about the behavioral management agreement  6. If a visitor's behavior poses a threat to safety call refer to organization policy.  7. Initiate consult with , Psychosocial CNS, Spiritual Care as appropriate  Outcome: Cori Medina was visible in the milieu for much of the day.  She ate all meals in the milieu and watched TV in the afternoon with peers.  Compliant with medication and with assessments this shift.  In the afternoon, Carol did get confused, went to her room and rested, then was able to come out for dinner.  Carol denied suicidal and homicidal ideations.  She admits to auditory hallucinations at times but none this shift.   
  Problem: Depression  Goal: Will be euthymic at discharge  Outcome: Progressing    Patient isolative to room most of shift. Medication compliant. Denies SI/HI. Patient reports + hallucinations reporting that she sees a baby in the room. Reassurance given. No behavioral issues. Resting in bed Will continue to monitor.     
  Problem: Psychosis  Goal: Will report no hallucinations or delusions  Description: INTERVENTIONS:  1. Administer medication as  ordered  2. Assist with reality testing to support increasing orientation  3. Assess if patient's hallucinations or delusions are encouraging self harm or harm to others and intervene as appropriate  Outcome: Progressing     Problem: Anxiety  Goal: Will report anxiety at manageable levels  Description: INTERVENTIONS:  1. Administer medication as ordered  2. Teach and rehearse alternative coping skills  3. Provide emotional support with 1:1 interaction with staff  Outcome: Progressing  Flowsheets (Taken 7/22/2024 2249 by Lexii Lopez LPN)  Will report anxiety at manageable levels:   Administer medication as ordered   Provide emotional support with 1:1 interaction with staff     Carol is alert and oriented to person, place, and time. She denies suicidal and homicidal ideations. She denies hallucinations. Carol reports some anxiety but denies the need for any PRN anxiety medications. She is compliant with all scheduled medications. Carol is isolative to her room, out for meals and needs.   
  Problem: Psychosis  Goal: Will report no hallucinations or delusions  Outcome: Progressing     Problem: Behavior  Goal: Pt/Family maintain appropriate behavior and adhere to behavioral management agreement, if implemented  Outcome: Not Progressing     Problem: Behavior  Goal: Pt/Family maintain appropriate behavior and adhere to behavioral management agreement, if implemented  Outcome: Not Progressing     Patient labile, became upset d/t  meeting with [facility] staff. Calling the unit phone several times asking for discharge. Denies SI/HI/AVH. Patient reports that she is feeling better and that she thought that she could hear her granddaughters when she came in. Medication compliant. Will continue to monitor.  
  Problem: Risk for Elopement  Goal: Patient will not exit the unit/facility without proper excort  Outcome: Progressing     Problem: Self Harm/Suicidality  Goal: Will have no self-injury during hospital stay  Outcome: Progressing     Problem: Depression  Goal: Will be euthymic at discharge  Outcome: Progressing     Problem: Psychosis  Goal: Will report no hallucinations or delusions  Outcome: Progressing      Patient has been more visible walking in the hallways. Denies SI/HI. Patient reports that she still is seeing babies and that she hears a dog barking. Reassurance given. Daughter agreed to Voluntary admission per Dr Hebert's ok. Daughter made aware this am that patient had an appointment this afternoon and Angela the dgt will follow up with the office. Med compliant. Refused shower  Will continue to monitor.  
  Problem: Risk for Elopement  Goal: Patient will not exit the unit/facility without proper excort  Recent Flowsheet Documentation  Taken 7/4/2024 0429 by Shayna Anthony RN  Nursing Interventions for Elopement Risk: Collaborate with treatment team for nicotine replacement     Problem: Psychosis  Goal: Will report no hallucinations or delusions  Description: INTERVENTIONS:  1. Administer medication as  ordered  2. Assist with reality testing to support increasing orientation  3. Assess if patient's hallucinations or delusions are encouraging self harm or harm to others and intervene as appropriate  Outcome: Progressing    Pt has been resting in her room. Prn tylenol given at 0954 for neck pain. Prn has been minimally effective. She denies SI/HI/AVH. Pt does report hallucinations at times but denies any currently     
  Problem: Safety - Adult  Goal: Free from fall injury  7/11/2024 2014 by Clara Stoner LPN  Outcome: Progressing     Problem: Pain  Goal: Verbalizes/displays adequate comfort level or baseline comfort level  7/11/2024 2014 by Clara Stoner LPN  Outcome: Progressing     Problem: Risk for Elopement  Goal: Patient will not exit the unit/facility without proper excort  Outcome: Progressing  Flowsheets (Taken 7/11/2024 2007)  Nursing Interventions for Elopement Risk:   Collaborate with treatment team for drug withdrawal symptoms treatment   Escort with two staff members if patient must leave the unit     Problem: Self Harm/Suicidality  Goal: Will have no self-injury during hospital stay  Description: INTERVENTIONS:  1.  Ensure constant observer at bedside with Q15M safety checks  2.  Maintain a safe environment  3.  Secure patient belongings  4.  Ensure family/visitors adhere to safety recommendations  5.  Ensure safety tray has been added to patient's diet order  6.  Every shift and PRN: Re-assess suicidal risk via Frequent Screener    7/11/2024 2014 by Clara Stoner LPN  Outcome: Progressing     Problem: Psychosis  Goal: Will report no hallucinations or delusions  Description: INTERVENTIONS:  1. Administer medication as  ordered  2. Assist with reality testing to support increasing orientation  3. Assess if patient's hallucinations or delusions are encouraging self harm or harm to others and intervene as appropriate  7/11/2024 1251 by Kim Peres, RN  Outcome: Not Progressing     Problem: Sleep Disturbance  Goal: Will exhibit normal sleeping pattern  Description: INTERVENTIONS:  1. Administer medication as ordered  2. Decrease environmental stimuli, including noise, as appropriate  3. Discourage social isolation and naps during the day  7/11/2024 1251 by iKm Peres, RN  Outcome: Not Progressing     
  Problem: Safety - Adult  Goal: Free from fall injury  7/12/2024 2152 by Lexii Lopez LPN  Outcome: Progressing     Problem: Pain  Goal: Verbalizes/displays adequate comfort level or baseline comfort level  7/12/2024 2152 by Lexii Lopez LPN  Outcome: Progressing     Problem: Risk for Elopement  Goal: Patient will not exit the unit/facility without proper excort  Outcome: Progressing     Problem: Self Harm/Suicidality  Goal: Will have no self-injury during hospital stay  Description: INTERVENTIONS:  1.  Ensure constant observer at bedside with Q15M safety checks  2.  Maintain a safe environment  3.  Secure patient belongings  4.  Ensure family/visitors adhere to safety recommendations  5.  Ensure safety tray has been added to patient's diet order  6.  Every shift and PRN: Re-assess suicidal risk via Frequent Screener    7/12/2024 2152 by Lexii Lopez LPN  Outcome: Progressing  Flowsheets (Taken 7/12/2024 2123)  Will have no self-injury during hospital stay:   Maintain a safe environment   Every shift and PRN: Re-assess suicidal risk via Frequent Screener     Problem: Depression  Goal: Will be euthymic at discharge  Description: INTERVENTIONS:  1. Administer medication as ordered  2. Provide emotional support via 1:1 interaction with staff  3. Encourage involvement in milieu/groups/activities  4. Monitor for social isolation  7/12/2024 2152 by Lexii Lopez LPN  Outcome: Progressing     Problem: Psychosis  Goal: Will report no hallucinations or delusions  Description: INTERVENTIONS:  1. Administer medication as  ordered  2. Assist with reality testing to support increasing orientation  3. Assess if patient's hallucinations or delusions are encouraging self harm or harm to others and intervene as appropriate  Outcome: Progressing     Problem: Behavior  Goal: Pt/Family maintain appropriate behavior and adhere to behavioral management agreement, if implemented  Description: INTERVENTIONS:  1. Assess 
  Problem: Safety - Adult  Goal: Free from fall injury  7/13/2024 1119 by Harika Mohan, RN  Outcome: Progressing  Flowsheets (Taken 7/13/2024 1119)  Free From Fall Injury: Instruct family/caregiver on patient safety  Note: Patient safety continues with patient wearing gripper socks, call light within reach, bed in low position. 15  minute checks continue.  7/12/2024 2152 by Lexii Lopez LPN  Outcome: Progressing     Problem: Pain  Goal: Verbalizes/displays adequate comfort level or baseline comfort level  7/13/2024 1119 by Harika Mohan, RN  Outcome: Progressing  Flowsheets (Taken 7/13/2024 1119)  Verbalizes/displays adequate comfort level or baseline comfort level:   Encourage patient to monitor pain and request assistance   Assess pain using appropriate pain scale   Administer analgesics based on type and severity of pain and evaluate response  Note: Will continue to assess pain and administer prn medications as ordered and assess effectiveness.  7/12/2024 2152 by Lexii Lopez LPN  Outcome: Progressing     Problem: Depression  Goal: Will be euthymic at discharge  Description: INTERVENTIONS:  1. Administer medication as ordered  2. Provide emotional support via 1:1 interaction with staff  3. Encourage involvement in milieu/groups/activities  4. Monitor for social isolation  7/13/2024 1119 by Harika Mohan, RN  Outcome: Progressing  Note: Patient pleasant and friendly. Will continue to monitor.  7/12/2024 2152 by Lexii Lopez LPN  Outcome: Progressing     
  Problem: Safety - Adult  Goal: Free from fall injury  7/13/2024 2156 by Caroline Roberts RN  Outcome: Progressing     Problem: Pain  Goal: Verbalizes/displays adequate comfort level or baseline comfort level  7/13/2024 2156 by Caroline Roberts RN  Outcome: Progressing     Problem: Risk for Elopement  Goal: Patient will not exit the unit/facility without proper excort  Outcome: Progressing     Problem: Self Harm/Suicidality  Goal: Will have no self-injury during hospital stay  Description: INTERVENTIONS:  1.  Ensure constant observer at bedside with Q15M safety checks  2.  Maintain a safe environment  3.  Secure patient belongings  4.  Ensure family/visitors adhere to safety recommendations  5.  Ensure safety tray has been added to patient's diet order  6.  Every shift and PRN: Re-assess suicidal risk via Frequent Screener    Outcome: Progressing     Problem: Depression  Goal: Will be euthymic at discharge  Description: INTERVENTIONS:  1. Administer medication as ordered  2. Provide emotional support via 1:1 interaction with staff  3. Encourage involvement in milieu/groups/activities  4. Monitor for social isolation  7/13/2024 2156 by Caroline Roberts RN  Outcome: Progressing     Problem: Psychosis  Goal: Will report no hallucinations or delusions  Description: INTERVENTIONS:  1. Administer medication as  ordered  2. Assist with reality testing to support increasing orientation  3. Assess if patient's hallucinations or delusions are encouraging self harm or harm to others and intervene as appropriate  Outcome: Progressing     Problem: Behavior  Goal: Pt/Family maintain appropriate behavior and adhere to behavioral management agreement, if implemented  Description: INTERVENTIONS:  1. Assess patient/family's coping skills and  non-compliant behavior (including use of illegal substances)  2. Notify security of behavior or suspected illegal substances which indicate the need for search of the family and/or 
  Problem: Safety - Adult  Goal: Free from fall injury  7/17/2024 2048 by Lexii Lopez LPN  Outcome: Progressing     Problem: Pain  Goal: Verbalizes/displays adequate comfort level or baseline comfort level  7/17/2024 2048 by Lexii Lopez LPN  Outcome: Progressing     Problem: Self Harm/Suicidality  Goal: Will have no self-injury during hospital stay  Description: INTERVENTIONS:  1.  Ensure constant observer at bedside with Q15M safety checks  2.  Maintain a safe environment  3.  Secure patient belongings  4.  Ensure family/visitors adhere to safety recommendations  5.  Ensure safety tray has been added to patient's diet order  6.  Every shift and PRN: Re-assess suicidal risk via Frequent Screener    7/17/2024 2048 by Lexii Lopez LPN  Outcome: Progressing  Flowsheets (Taken 7/17/2024 2037)  Will have no self-injury during hospital stay:   Maintain a safe environment   Every shift and PRN: Re-assess suicidal risk via Frequent Screener     Problem: Psychosis  Goal: Will report no hallucinations or delusions  Description: INTERVENTIONS:  1. Administer medication as  ordered  2. Assist with reality testing to support increasing orientation  3. Assess if patient's hallucinations or delusions are encouraging self harm or harm to others and intervene as appropriate  Outcome: Progressing     Problem: Behavior  Goal: Pt/Family maintain appropriate behavior and adhere to behavioral management agreement, if implemented  Description: INTERVENTIONS:  1. Assess patient/family's coping skills and  non-compliant behavior (including use of illegal substances)  2. Notify security of behavior or suspected illegal substances which indicate the need for search of the family and/or belongings  3. Encourage verbalization of thoughts and concerns in a socially appropriate manner  4. Utilize positive, consistent limit setting strategies supporting safety of patient, staff and others  5. Encourage participation in the decision making 
  Problem: Safety - Adult  Goal: Free from fall injury  7/19/2024 2312 by Lexii Lopez LPN  Outcome: Progressing     Problem: Pain  Goal: Verbalizes/displays adequate comfort level or baseline comfort level  Outcome: Progressing     Problem: Risk for Elopement  Goal: Patient will not exit the unit/facility without proper excort  7/19/2024 2312 by Lexii Lopez LPN  Outcome: Progressing     Problem: Self Harm/Suicidality  Goal: Will have no self-injury during hospital stay  Description: INTERVENTIONS:  1.  Ensure constant observer at bedside with Q15M safety checks  2.  Maintain a safe environment  3.  Secure patient belongings  4.  Ensure family/visitors adhere to safety recommendations  5.  Ensure safety tray has been added to patient's diet order  6.  Every shift and PRN: Re-assess suicidal risk via Frequent Screener    7/19/2024 2312 by Lexii Lopez LPN  Outcome: Progressing  Flowsheets (Taken 7/19/2024 2253)  Will have no self-injury during hospital stay:   Maintain a safe environment   Every shift and PRN: Re-assess suicidal risk via Frequent Screener     Problem: Depression  Goal: Will be euthymic at discharge  Description: INTERVENTIONS:  1. Administer medication as ordered  2. Provide emotional support via 1:1 interaction with staff  3. Encourage involvement in milieu/groups/activities  4. Monitor for social isolation  Outcome: Progressing     Problem: Psychosis  Goal: Will report no hallucinations or delusions  Description: INTERVENTIONS:  1. Administer medication as  ordered  2. Assist with reality testing to support increasing orientation  3. Assess if patient's hallucinations or delusions are encouraging self harm or harm to others and intervene as appropriate  Outcome: Progressing     Problem: Behavior  Goal: Pt/Family maintain appropriate behavior and adhere to behavioral management agreement, if implemented  Description: INTERVENTIONS:  1. Assess patient/family's coping skills and  non-compliant 
  Problem: Safety - Adult  Goal: Free from fall injury  7/5/2024 2153 by Yessenia Jaimes LPN  Outcome: Progressing     Problem: Pain  Goal: Verbalizes/displays adequate comfort level or baseline comfort level  7/5/2024 2153 by Yessenia Jaimes LPN  Outcome: Progressing     Problem: Psychosis  Goal: Will report no hallucinations or delusions  Description: INTERVENTIONS:  1. Administer medication as  ordered  2. Assist with reality testing to support increasing orientation  3. Assess if patient's hallucinations or delusions are encouraging self harm or harm to others and intervene as appropriate  Outcome: Not Progressing     Problem: Anxiety  Goal: Will report anxiety at manageable levels  Description: INTERVENTIONS:  1. Administer medication as ordered  2. Teach and rehearse alternative coping skills  3. Provide emotional support with 1:1 interaction with staff  Outcome: Not Progressing     
  Problem: Safety - Adult  Goal: Free from fall injury  7/6/2024 2206 by Yessenia Jaimes LPN  Outcome: Progressing     Problem: Pain  Goal: Verbalizes/displays adequate comfort level or baseline comfort level  7/6/2024 2206 by Yessenia Jaimes LPN  Outcome: Progressing     Problem: Risk for Elopement  Goal: Patient will not exit the unit/facility without proper excort  7/6/2024 2206 by Yessenia Jaimes LPN  Outcome: Progressing     Problem: Self Harm/Suicidality  Goal: Will have no self-injury during hospital stay  Description: INTERVENTIONS:  1.  Ensure constant observer at bedside with Q15M safety checks  2.  Maintain a safe environment  3.  Secure patient belongings  4.  Ensure family/visitors adhere to safety recommendations  5.  Ensure safety tray has been added to patient's diet order  6.  Every shift and PRN: Re-assess suicidal risk via Frequent Screener    Outcome: Progressing     Problem: Anxiety  Goal: Will report anxiety at manageable levels  Description: INTERVENTIONS:  1. Administer medication as ordered  2. Teach and rehearse alternative coping skills  3. Provide emotional support with 1:1 interaction with staff  7/6/2024 2206 by Yessenia Jaimes LPN  Outcome: Progressing     
  Problem: Safety - Adult  Goal: Free from fall injury  7/7/2024 2114 by Clara Stoner LPN  Outcome: Progressing     Problem: Pain  Goal: Verbalizes/displays adequate comfort level or baseline comfort level  7/7/2024 2114 by Clara Stoner LPN  Outcome: Progressing     Problem: Risk for Elopement  Goal: Patient will not exit the unit/facility without proper excort  7/7/2024 2114 by Clara Stoner LPN  Outcome: Progressing     Problem: Self Harm/Suicidality  Goal: Will have no self-injury during hospital stay  Description: INTERVENTIONS:  1.  Ensure constant observer at bedside with Q15M safety checks  2.  Maintain a safe environment  3.  Secure patient belongings  4.  Ensure family/visitors adhere to safety recommendations  5.  Ensure safety tray has been added to patient's diet order  6.  Every shift and PRN: Re-assess suicidal risk via Frequent Screener    7/7/2024 2114 by Clara Stoner LPN  Outcome: Progressing     Problem: Psychosis  Goal: Will report no hallucinations or delusions  Description: INTERVENTIONS:  1. Administer medication as  ordered  2. Assist with reality testing to support increasing orientation  3. Assess if patient's hallucinations or delusions are encouraging self harm or harm to others and intervene as appropriate  7/7/2024 1106 by Kim Peres RN  Outcome: Progressing     Problem: Psychosis  Goal: Will report no hallucinations or delusions  Description: INTERVENTIONS:  1. Administer medication as  ordered  2. Assist with reality testing to support increasing orientation  3. Assess if patient's hallucinations or delusions are encouraging self harm or harm to others and intervene as appropriate  7/7/2024 1106 by Kim Peres RN  Outcome: Progressing     
  Problem: Safety - Adult  Goal: Free from fall injury  7/8/2024 2104 by Caroline Roberts RN  Outcome: Progressing     Problem: Pain  Goal: Verbalizes/displays adequate comfort level or baseline comfort level  7/8/2024 2104 by Caroline Roberts RN  Outcome: Progressing     Problem: Risk for Elopement  Goal: Patient will not exit the unit/facility without proper excort  7/8/2024 2104 by Caroline Roberts RN  Outcome: Progressing     Problem: Self Harm/Suicidality  Goal: Will have no self-injury during hospital stay  Description: INTERVENTIONS:  1.  Ensure constant observer at bedside with Q15M safety checks  2.  Maintain a safe environment  3.  Secure patient belongings  4.  Ensure family/visitors adhere to safety recommendations  5.  Ensure safety tray has been added to patient's diet order  6.  Every shift and PRN: Re-assess suicidal risk via Frequent Screener    7/8/2024 2104 by Caroline Roberts RN  Outcome: Progressing     Problem: Behavior  Goal: Pt/Family maintain appropriate behavior and adhere to behavioral management agreement, if implemented  Description: INTERVENTIONS:  1. Assess patient/family's coping skills and  non-compliant behavior (including use of illegal substances)  2. Notify security of behavior or suspected illegal substances which indicate the need for search of the family and/or belongings  3. Encourage verbalization of thoughts and concerns in a socially appropriate manner  4. Utilize positive, consistent limit setting strategies supporting safety of patient, staff and others  5. Encourage participation in the decision making process about the behavioral management agreement  6. If a visitor's behavior poses a threat to safety call refer to organization policy.  7. Initiate consult with , Psychosocial CNS, Spiritual Care as appropriate  7/8/2024 2104 by Caroline Roberts RN  Outcome: Progressing     Problem: Involuntary Admit  Goal: Will cooperate with staff 
  Problem: Safety - Adult  Goal: Free from fall injury  Outcome: Progressing     Problem: Depression  Goal: Will be euthymic at discharge  Description: INTERVENTIONS:  1. Administer medication as ordered  2. Provide emotional support via 1:1 interaction with staff  3. Encourage involvement in milieu/groups/activities  4. Monitor for social isolation  Outcome: Progressing     
  Problem: Safety - Adult  Goal: Free from fall injury  Outcome: Progressing     Problem: Pain  Goal: Verbalizes/displays adequate comfort level or baseline comfort level  Outcome: Progressing     Problem: Risk for Elopement  Goal: Patient will not exit the unit/facility without proper excort  7/10/2024 1948 by Claar Stoner LPN  Outcome: Progressing  Flowsheets (Taken 7/10/2024 1945)  Nursing Interventions for Elopement Risk:   Collaborate with treatment team for drug withdrawal symptoms treatment   Escort with two staff members if patient must leave the unit     Problem: Self Harm/Suicidality  Goal: Will have no self-injury during hospital stay  Description: INTERVENTIONS:  1.  Ensure constant observer at bedside with Q15M safety checks  2.  Maintain a safe environment  3.  Secure patient belongings  4.  Ensure family/visitors adhere to safety recommendations  5.  Ensure safety tray has been added to patient's diet order  6.  Every shift and PRN: Re-assess suicidal risk via Frequent Screener    7/10/2024 1948 by Clara Stoner LPN  Outcome: Progressing     
  Problem: Safety - Adult  Goal: Free from fall injury  Outcome: Progressing     Problem: Pain  Goal: Verbalizes/displays adequate comfort level or baseline comfort level  Outcome: Progressing     Problem: Risk for Elopement  Goal: Patient will not exit the unit/facility without proper excort  Outcome: Progressing     Problem: Self Harm/Suicidality  Goal: Will have no self-injury during hospital stay  Description: INTERVENTIONS:  1.  Ensure constant observer at bedside with Q15M safety checks  2.  Maintain a safe environment  3.  Secure patient belongings  4.  Ensure family/visitors adhere to safety recommendations  5.  Ensure safety tray has been added to patient's diet order  6.  Every shift and PRN: Re-assess suicidal risk via Frequent Screener    Outcome: Progressing     Problem: Depression  Goal: Will be euthymic at discharge  Description: INTERVENTIONS:  1. Administer medication as ordered  2. Provide emotional support via 1:1 interaction with staff  3. Encourage involvement in milieu/groups/activities  4. Monitor for social isolation  7/15/2024 2317 by Caroline Roberts RN  Outcome: Progressing     Problem: Psychosis  Goal: Will report no hallucinations or delusions  Description: INTERVENTIONS:  1. Administer medication as  ordered  2. Assist with reality testing to support increasing orientation  3. Assess if patient's hallucinations or delusions are encouraging self harm or harm to others and intervene as appropriate  7/15/2024 2317 by Caroline Roberts RN  Outcome: Progressing     Problem: Behavior  Goal: Pt/Family maintain appropriate behavior and adhere to behavioral management agreement, if implemented  Description: INTERVENTIONS:  1. Assess patient/family's coping skills and  non-compliant behavior (including use of illegal substances)  2. Notify security of behavior or suspected illegal substances which indicate the need for search of the family and/or belongings  3. Encourage verbalization of 
  Problem: Safety - Adult  Goal: Free from fall injury  Outcome: Progressing     Problem: Pain  Goal: Verbalizes/displays adequate comfort level or baseline comfort level  Outcome: Progressing     Problem: Risk for Elopement  Goal: Patient will not exit the unit/facility without proper excort  Outcome: Progressing     Problem: Self Harm/Suicidality  Goal: Will have no self-injury during hospital stay  Description: INTERVENTIONS:  1.  Ensure constant observer at bedside with Q15M safety checks  2.  Maintain a safe environment  3.  Secure patient belongings  4.  Ensure family/visitors adhere to safety recommendations  5.  Ensure safety tray has been added to patient's diet order  6.  Every shift and PRN: Re-assess suicidal risk via Frequent Screener    Outcome: Progressing     Problem: Depression  Goal: Will be euthymic at discharge  Description: INTERVENTIONS:  1. Administer medication as ordered  2. Provide emotional support via 1:1 interaction with staff  3. Encourage involvement in milieu/groups/activities  4. Monitor for social isolation  Outcome: Progressing     Problem: Psychosis  Goal: Will report no hallucinations or delusions  Description: INTERVENTIONS:  1. Administer medication as  ordered  2. Assist with reality testing to support increasing orientation  3. Assess if patient's hallucinations or delusions are encouraging self harm or harm to others and intervene as appropriate  7/4/2024 2329 by Emily Spence RN  Outcome: Progressing     Problem: Behavior  Goal: Pt/Family maintain appropriate behavior and adhere to behavioral management agreement, if implemented  Description: INTERVENTIONS:  1. Assess patient/family's coping skills and  non-compliant behavior (including use of illegal substances)  2. Notify security of behavior or suspected illegal substances which indicate the need for search of the family and/or belongings  3. Encourage verbalization of thoughts and concerns in a socially appropriate 
  Problem: Safety - Adult  Goal: Free from fall injury  Outcome: Progressing     Problem: Pain  Goal: Verbalizes/displays adequate comfort level or baseline comfort level  Outcome: Progressing     Problem: Risk for Elopement  Goal: Patient will not exit the unit/facility without proper excort  Outcome: Progressing     Problem: Self Harm/Suicidality  Goal: Will have no self-injury during hospital stay  Description: INTERVENTIONS:  1.  Ensure constant observer at bedside with Q15M safety checks  2.  Maintain a safe environment  3.  Secure patient belongings  4.  Ensure family/visitors adhere to safety recommendations  5.  Ensure safety tray has been added to patient's diet order  6.  Every shift and PRN: Re-assess suicidal risk via Frequent Screener    Outcome: Progressing     Problem: Depression  Goal: Will be euthymic at discharge  Description: INTERVENTIONS:  1. Administer medication as ordered  2. Provide emotional support via 1:1 interaction with staff  3. Encourage involvement in milieu/groups/activities  4. Monitor for social isolation  Outcome: Progressing     Problem: Psychosis  Goal: Will report no hallucinations or delusions  Description: INTERVENTIONS:  1. Administer medication as  ordered  2. Assist with reality testing to support increasing orientation  3. Assess if patient's hallucinations or delusions are encouraging self harm or harm to others and intervene as appropriate  Outcome: Progressing     Problem: Behavior  Goal: Pt/Family maintain appropriate behavior and adhere to behavioral management agreement, if implemented  Description: INTERVENTIONS:  1. Assess patient/family's coping skills and  non-compliant behavior (including use of illegal substances)  2. Notify security of behavior or suspected illegal substances which indicate the need for search of the family and/or belongings  3. Encourage verbalization of thoughts and concerns in a socially appropriate manner  4. Utilize positive, consistent 
  Problem: Safety - Adult  Goal: Free from fall injury  Outcome: Progressing     Problem: Risk for Elopement  Goal: Patient will not exit the unit/facility without proper excort  Outcome: Progressing     Problem: Self Harm/Suicidality  Goal: Will have no self-injury during hospital stay  Description: INTERVENTIONS:  1.  Ensure constant observer at bedside with Q15M safety checks  2.  Maintain a safe environment  3.  Secure patient belongings  4.  Ensure family/visitors adhere to safety recommendations  5.  Ensure safety tray has been added to patient's diet order  6.  Every shift and PRN: Re-assess suicidal risk via Frequent Screener    7/9/2024 2204 by Caroline Roberts RN  Outcome: Progressing     Problem: Behavior  Goal: Pt/Family maintain appropriate behavior and adhere to behavioral management agreement, if implemented  Description: INTERVENTIONS:  1. Assess patient/family's coping skills and  non-compliant behavior (including use of illegal substances)  2. Notify security of behavior or suspected illegal substances which indicate the need for search of the family and/or belongings  3. Encourage verbalization of thoughts and concerns in a socially appropriate manner  4. Utilize positive, consistent limit setting strategies supporting safety of patient, staff and others  5. Encourage participation in the decision making process about the behavioral management agreement  6. If a visitor's behavior poses a threat to safety call refer to organization policy.  7. Initiate consult with , Psychosocial CNS, Spiritual Care as appropriate  Outcome: Progressing     Problem: Involuntary Admit  Goal: Will cooperate with staff recommendations and doctor's orders and will demonstrate appropriate behavior  Description: INTERVENTIONS:  1. Treat underlying conditions and offer medication as ordered  2. Educate regarding involuntary admission procedures and rules  3. Contain excessive/inappropriate behavior per 
  Problem: Safety - Adult  Goal: Free from fall injury  Outcome: Progressing     Problem: Risk for Elopement  Goal: Patient will not exit the unit/facility without proper excort  Outcome: Progressing     Problem: Self Harm/Suicidality  Goal: Will have no self-injury during hospital stay  Description: INTERVENTIONS:  1.  Ensure constant observer at bedside with Q15M safety checks  2.  Maintain a safe environment  3.  Secure patient belongings  4.  Ensure family/visitors adhere to safety recommendations  5.  Ensure safety tray has been added to patient's diet order  6.  Every shift and PRN: Re-assess suicidal risk via Frequent Screener    Outcome: Progressing     Problem: Depression  Goal: Will be euthymic at discharge  Description: INTERVENTIONS:  1. Administer medication as ordered  2. Provide emotional support via 1:1 interaction with staff  3. Encourage involvement in milieu/groups/activities  4. Monitor for social isolation  Outcome: Progressing     Problem: Psychosis  Goal: Will report no hallucinations or delusions  Description: INTERVENTIONS:  1. Administer medication as  ordered  2. Assist with reality testing to support increasing orientation  3. Assess if patient's hallucinations or delusions are encouraging self harm or harm to others and intervene as appropriate  Outcome: Progressing     Problem: Behavior  Goal: Pt/Family maintain appropriate behavior and adhere to behavioral management agreement, if implemented  Description: INTERVENTIONS:  1. Assess patient/family's coping skills and  non-compliant behavior (including use of illegal substances)  2. Notify security of behavior or suspected illegal substances which indicate the need for search of the family and/or belongings  3. Encourage verbalization of thoughts and concerns in a socially appropriate manner  4. Utilize positive, consistent limit setting strategies supporting safety of patient, staff and others  5. Encourage participation in the decision 
  Problem: Self Harm/Suicidality  Goal: Will have no self-injury during hospital stay  Description: INTERVENTIONS:  1.  Ensure constant observer at bedside with Q15M safety checks  2.  Maintain a safe environment  3.  Secure patient belongings  4.  Ensure family/visitors adhere to safety recommendations  5.  Ensure safety tray has been added to patient's diet order  6.  Every shift and PRN: Re-assess suicidal risk via Frequent Screener    7/20/2024 1259 by Lida Cano, RN  Outcome: Progressing     Problem: Psychosis  Goal: Will report no hallucinations or delusions  Description: INTERVENTIONS:  1. Administer medication as  ordered  2. Assist with reality testing to support increasing orientation  3. Assess if patient's hallucinations or delusions are encouraging self harm or harm to others and intervene as appropriate  7/20/2024 1259 by Lida Cano, RN  Outcome: Progressing     Carol is alert and oriented to person, place, and time but disoriented to situation. She denies hallucinations and suicidal and homicidal ideations. Carol has been isolative to her room for much of the shift, out for meals and social while in dayroom. 1:1 talk time provided. She states she is \"just so frustrated with being here\" and \"I have no purpose anymore since my kids are grown\". Carol reported anxiety earlier this shift, PRN xanax was effective.   
  Problem: Self Harm/Suicidality  Goal: Will have no self-injury during hospital stay  Description: INTERVENTIONS:  1.  Ensure constant observer at bedside with Q15M safety checks  2.  Maintain a safe environment  3.  Secure patient belongings  4.  Ensure family/visitors adhere to safety recommendations  5.  Ensure safety tray has been added to patient's diet order  6.  Every shift and PRN: Re-assess suicidal risk via Frequent Screener    7/21/2024 1255 by Lida Cano, RN  Outcome: Progressing     Problem: Psychosis  Goal: Will report no hallucinations or delusions  Description: INTERVENTIONS:  1. Administer medication as  ordered  2. Assist with reality testing to support increasing orientation  3. Assess if patient's hallucinations or delusions are encouraging self harm or harm to others and intervene as appropriate  7/21/2024 1255 by Lida Cano, RN  Outcome: Progressing     Carol is alert and oriented to person, place, and time. She denies suicidal and homicidal ideations. She denies hallucinations and is not seen responding to internal stimuli. She is mostly isolative to her room and tearful during interview. PRN atarax given 0833, PRN xanax given 1345. Carol reports diarrhea, medical notified. Antonio JOHNSTON instructed writer to put a hat in Carol's toilet. Carol has alerted writer to one solid bowel movement this shift. Carol is visiting with family.  
  Problem: Self Harm/Suicidality  Goal: Will have no self-injury during hospital stay  Outcome: Progressing     Problem: Depression  Goal: Will be euthymic at discharge  Outcome: Progressing     Problem: Psychosis  Goal: Will report no hallucinations or delusions  Outcome: Progressing     Problem: Behavior  Goal: Pt/Family maintain appropriate behavior and adhere to behavioral management agreement, if implemented  Outcome: Progressing     Problem: Pain  Goal: Verbalizes/displays adequate comfort level or baseline comfort level  Outcome: Not Progressing       Patient relaxed, visible on unit for meals and needs. Apologized to this nurse for the day before. Reassurance given. Oriented x3. Denies SI/HI/AVH. Patient denies delusions and none voiced. On phone several times. Will continue to monitor.  
[facility]  Week Interdisciplinary Treatment Plan Note     Review Date & Time: 7/11/2024 0905    Patient was not in treatment team.    Estimated Length of Stay Update:  2-3 days   Estimated Discharge Date Update: 7/13/24-7/14/24    EDUCATION:   Learner Progress Toward Treatment Goals: Reviewed results and recommendations of this team    Method: Individual    Outcome: Verbalized understanding    PATIENT GOALS: none expressed    PLAN/TREATMENT RECOMMENDATIONS UPDATE: continue treatment    GOALS UPDATE:  Time frame for Short-Term Goals: 2 days      Avelino Servin RN   
Carol has been pleasant and cooperative with care this shift. Patient denies SI/HI and A/VH. Patient is oriented x 4 and medication compliant. PRN Xanax requested and given for anxiety at 2100. Medication was effective. No distress noted.   Problem: Self Harm/Suicidality  Goal: Will have no self-injury during hospital stay  Description: INTERVENTIONS:  1.  Ensure constant observer at bedside with Q15M safety checks  2.  Maintain a safe environment  3.  Secure patient belongings  4.  Ensure family/visitors adhere to safety recommendations  5.  Ensure safety tray has been added to patient's diet order  6.  Every shift and PRN: Re-assess suicidal risk via Frequent Screener    7/21/2024 2214 by Brandi Rogers, RN  Outcome: Progressing     Problem: Depression  Goal: Will be euthymic at discharge  Description: INTERVENTIONS:  1. Administer medication as ordered  2. Provide emotional support via 1:1 interaction with staff  3. Encourage involvement in milieu/groups/activities  4. Monitor for social isolation  Outcome: Progressing     Problem: Psychosis  Goal: Will report no hallucinations or delusions  Description: INTERVENTIONS:  1. Administer medication as  ordered  2. Assist with reality testing to support increasing orientation  3. Assess if patient's hallucinations or delusions are encouraging self harm or harm to others and intervene as appropriate  7/21/2024 2214 by Brandi Rogers, RN  Outcome: Progressing     
Carol is alert and oriented to self only. She has been delusional with RTIS noted. She sees her baby in bed with her and talks to her often. She has been weepy and anxious, but Xanax was effective. She stays mostly in her room taking care of her baby. She looks lovingly at a pile of blankets and states how happy the baby is. She is unable to redirect and continues to hang around her door or checks in her room often. She denies SI/HI. She has denied any needs thus far today.  Problem: Depression  Goal: Will be euthymic at discharge  Description: INTERVENTIONS:  1. Administer medication as ordered  2. Provide emotional support via 1:1 interaction with staff  3. Encourage involvement in milieu/groups/activities  4. Monitor for social isolation  Outcome: Not Progressing     Problem: Psychosis  Goal: Will report no hallucinations or delusions  Description: INTERVENTIONS:  1. Administer medication as  ordered  2. Assist with reality testing to support increasing orientation  3. Assess if patient's hallucinations or delusions are encouraging self harm or harm to others and intervene as appropriate  Outcome: Not Progressing     Problem: Anxiety  Goal: Will report anxiety at manageable levels  Description: INTERVENTIONS:  1. Administer medication as ordered  2. Teach and rehearse alternative coping skills  3. Provide emotional support with 1:1 interaction with staff  Outcome: Not Progressing  Flowsheets (Taken 7/8/2024 1143)  Will report anxiety at manageable levels:   Administer medication as ordered   Teach and rehearse alternative coping skills   Provide emotional support with 1:1 interaction with staff     
Patient is alert and oriented x 1, self only. Takes medications whole with water and without issue.  Patient is independent and continent. Friendly and cooperative with staff. Is visible on unit during mealtime.  Interacting with peers appropriately. Delusions that family members  yesterday. VH of people in her room. +RTIS noted.  PRN Xanax this AM for increasing anxiety. PRN Tylenol + Zanaflex for 9/10 neck pain.      Problem: Psychosis  Goal: Will report no hallucinations or delusions  Description: INTERVENTIONS:  1. Administer medication as  ordered  2. Assist with reality testing to support increasing orientation  3. Assess if patient's hallucinations or delusions are encouraging self harm or harm to others and intervene as appropriate  2024 by Yessenia Jaimes LPN  Outcome: Not Progressing     Problem: Safety - Adult  Goal: Free from fall injury  2024 by Kim Peres RN  Outcome: Progressing  2024 by Yessenia Jaimes LPN  Outcome: Progressing     Problem: Pain  Goal: Verbalizes/displays adequate comfort level or baseline comfort level  2024 by Kim Peres RN  Outcome: Progressing  2024 by Yessenia Jaimes LPN  Outcome: Progressing     Problem: Risk for Elopement  Goal: Patient will not exit the unit/facility without proper excort  Outcome: Progressing     Problem: Anxiety  Goal: Will report anxiety at manageable levels  Description: INTERVENTIONS:  1. Administer medication as ordered  2. Teach and rehearse alternative coping skills  3. Provide emotional support with 1:1 interaction with staff  2024 by Kim Peres RN  Outcome: Progressing  2024 by Yessenia Jaimes LPN  Outcome: Not Progressing     Problem: Sleep Disturbance  Goal: Will exhibit normal sleeping pattern  Description: INTERVENTIONS:  1. Administer medication as ordered  2. Decrease environmental stimuli, including noise, as appropriate  3. Discourage social 
Patient is alert and oriented x 1. Takes medications whole with water and without issue.  Patient is independent and continent. Calm, friendly and cooperative with staff. Is visible on unit during mealtime.  Interacting with peers appropriately. Denies SI/HI. Presents AH/VH:  Sees \"babies\" in her room. Educated her that there are no babies in her room and babies are not allowed on the unit.  PRN Xanax given this AM for increasing anxiety. Educated patient to let staff know if anxiety increases or if she needs to talk. Patient denies pain at this time. Warm blanket given. Patient resting comfortably         Problem: Safety - Adult  Goal: Free from fall injury  7/7/2024 1106 by Kim Peres RN  Outcome: Progressing  7/6/2024 2206 by Yessenia Jaimes LPN  Outcome: Progressing     Problem: Pain  Goal: Verbalizes/displays adequate comfort level or baseline comfort level  7/7/2024 1106 by Kim Peres RN  Outcome: Progressing  7/6/2024 2206 by Yessenia Jaimes LPN  Outcome: Progressing     Problem: Risk for Elopement  Goal: Patient will not exit the unit/facility without proper excort  7/7/2024 1106 by Kim Peres RN  Outcome: Progressing  7/6/2024 2206 by Yessenia Jaimes LPN  Outcome: Progressing     Problem: Self Harm/Suicidality  Goal: Will have no self-injury during hospital stay  Description: INTERVENTIONS:  1.  Ensure constant observer at bedside with Q15M safety checks  2.  Maintain a safe environment  3.  Secure patient belongings  4.  Ensure family/visitors adhere to safety recommendations  5.  Ensure safety tray has been added to patient's diet order  6.  Every shift and PRN: Re-assess suicidal risk via Frequent Screener    7/7/2024 1106 by Kim Peres RN  Outcome: Progressing  7/6/2024 2206 by Yessenia Jaimes LPN  Outcome: Progressing     Problem: Psychosis  Goal: Will report no hallucinations or delusions  Description: INTERVENTIONS:  1. Administer medication as  ordered  2. 
Patient is alert and oriented x 1. Takes medications whole with water and without issue. Attends groups. Patient is independent and continent. Cooperative with staff. Is visible on unit.  Interacting with peers appropriately. Denies SI/HI. +RTIS noted, has AH/VH of babies in her room, grandson in her room. Thinks her dog is on the unit and lost.  Patient increasingly anxious this morning, PRN Xanax given for anxiety. PRN Zyprexa given for increasing aggression. Patient spoke with her daughter on the phone, that seemed to help with the confusion about the dog. Resting comfortably in her room.      Problem: Safety - Adult  Goal: Free from fall injury  Outcome: Progressing     Problem: Pain  Goal: Verbalizes/displays adequate comfort level or baseline comfort level  Outcome: Progressing     Problem: Self Harm/Suicidality  Goal: Will have no self-injury during hospital stay  Description: INTERVENTIONS:  1.  Ensure constant observer at bedside with Q15M safety checks  2.  Maintain a safe environment  3.  Secure patient belongings  4.  Ensure family/visitors adhere to safety recommendations  5.  Ensure safety tray has been added to patient's diet order  6.  Every shift and PRN: Re-assess suicidal risk via Frequent Screener    Outcome: Progressing     Problem: Involuntary Admit  Goal: Will cooperate with staff recommendations and doctor's orders and will demonstrate appropriate behavior  Description: INTERVENTIONS:  1. Treat underlying conditions and offer medication as ordered  2. Educate regarding involuntary admission procedures and rules  3. Contain excessive/inappropriate behavior per unit and hospital policies  Outcome: Progressing     Problem: Self Care Deficit  Goal: Return ADL status to a safe level of function  Description: INTERVENTIONS:  1. Administer medication as ordered  2. Assess ADL deficits and provide assistive devices as needed  3. Obtain PT/OT consults as needed  4. Assist and instruct patient to 
Patient is alert and oriented x 1. Takes medications whole with water and without issue. Patient is independent and continent. Irritable and anxious this AM. PRN Xanax given. Patient took shower. Is feeling more calm now. Friendly and cooperative with staff. Is visible on unit.  Interacting with peers appropriately. Denies SI/HI. +RTIS noted, stated there was a baby in her bed when she woke up, a real one, not a doll. Patient looking for her dog this AM, thinking it should be on the unit and now it is lost.        Problem: Safety - Adult  Goal: Free from fall injury  Outcome: Progressing     Problem: Pain  Goal: Verbalizes/displays adequate comfort level or baseline comfort level  Outcome: Progressing     Problem: Self Harm/Suicidality  Goal: Will have no self-injury during hospital stay  Description: INTERVENTIONS:  1.  Ensure constant observer at bedside with Q15M safety checks  2.  Maintain a safe environment  3.  Secure patient belongings  4.  Ensure family/visitors adhere to safety recommendations  5.  Ensure safety tray has been added to patient's diet order  6.  Every shift and PRN: Re-assess suicidal risk via Frequent Screener    Outcome: Progressing     Problem: Depression  Goal: Will be euthymic at discharge  Description: INTERVENTIONS:  1. Administer medication as ordered  2. Provide emotional support via 1:1 interaction with staff  3. Encourage involvement in milieu/groups/activities  4. Monitor for social isolation  Outcome: Progressing     Problem: Anxiety  Goal: Will report anxiety at manageable levels  Description: INTERVENTIONS:  1. Administer medication as ordered  2. Teach and rehearse alternative coping skills  3. Provide emotional support with 1:1 interaction with staff  Outcome: Progressing     
Patient is alert and oriented x 2. Takes medications whole with water and without issue.  Patient is independent and continent. Calm, friendly and cooperative with staff. Is visible on unit at mealtimes.  Interacting with peers appropriately. Denies AH/VH/SI/HI and no RTIS noted.  No signs or symptoms of distress.         Problem: Safety - Adult  Goal: Free from fall injury  Outcome: Progressing     Problem: Pain  Goal: Verbalizes/displays adequate comfort level or baseline comfort level  Outcome: Progressing     Problem: Risk for Elopement  Goal: Patient will not exit the unit/facility without proper excort  Outcome: Progressing     Problem: Self Harm/Suicidality  Goal: Will have no self-injury during hospital stay  Description: INTERVENTIONS:  1.  Ensure constant observer at bedside with Q15M safety checks  2.  Maintain a safe environment  3.  Secure patient belongings  4.  Ensure family/visitors adhere to safety recommendations  5.  Ensure safety tray has been added to patient's diet order  6.  Every shift and PRN: Re-assess suicidal risk via Frequent Screener    Outcome: Progressing     
Patient is alert and oriented x 2. Takes medications whole with water and without issue. Attends group.  Patient is independent and continent. Calm, friendly and cooperative with staff. Is visible on unit.  Interacting with peers appropriately. Denies AH/VH/SI/HI and no RTIS noted.  No signs or symptoms of distress. PRN Tylenol given for bilateral knee pain. Patient claims it is arthritis pain.         Problem: Safety - Adult  Goal: Free from fall injury  7/18/2024 0914 by Kim Peres RN  Outcome: Progressing  7/17/2024 2048 by Lexii Lopez LPN  Outcome: Progressing     Problem: Pain  Goal: Verbalizes/displays adequate comfort level or baseline comfort level  7/17/2024 2048 by Lexii Lopez LPN  Outcome: Progressing     Problem: Risk for Elopement  Goal: Patient will not exit the unit/facility without proper excort  Outcome: Progressing     Problem: Self Harm/Suicidality  Goal: Will have no self-injury during hospital stay  Description: INTERVENTIONS:  1.  Ensure constant observer at bedside with Q15M safety checks  2.  Maintain a safe environment  3.  Secure patient belongings  4.  Ensure family/visitors adhere to safety recommendations  5.  Ensure safety tray has been added to patient's diet order  6.  Every shift and PRN: Re-assess suicidal risk via Frequent Screener    7/17/2024 2048 by Lexii Lopez LPN  Outcome: Progressing  Flowsheets (Taken 7/17/2024 2037)  Will have no self-injury during hospital stay:   Maintain a safe environment   Every shift and PRN: Re-assess suicidal risk via Frequent Screener     Problem: Psychosis  Goal: Will report no hallucinations or delusions  Description: INTERVENTIONS:  1. Administer medication as  ordered  2. Assist with reality testing to support increasing orientation  3. Assess if patient's hallucinations or delusions are encouraging self harm or harm to others and intervene as appropriate  7/18/2024 0914 by Kim Peres RN  Outcome: Progressing  7/17/2024 
Pt  iso to self and room she took pm medications with no issues. When speaking to pt and doing assessment pt was tearful when speaking to this writer about how she misses her family and wants to go home and that she was writing her poems which also was making her cry. Pt stated that she is having pain in her neck 8/10 pt stated that she is having trouble staying asleep during my assessment when this writer asked her. Pt denies SI/HI/AH prns given: Zyprexa, zanaflex, and trazodone were effective       Problem: Safety - Adult  Goal: Free from fall injury  Outcome: Progressing     Problem: Pain  Goal: Verbalizes/displays adequate comfort level or baseline comfort level  7/16/2024 2138 by Mari Bates LPN  Outcome: Progressing     Problem: Risk for Elopement  Goal: Patient will not exit the unit/facility without proper excort  Outcome: Progressing  Flowsheets (Taken 7/16/2024 2130)  Nursing Interventions for Elopement Risk:   Assist with personal care needs such as toileting, eating, dressing, as needed to reduce the risk of wandering   Make sure patient has all necessary personal care items   Reduce environmental triggers   Shoes and clothing collected and placed in gown attire     Problem: Self Harm/Suicidality  Goal: Will have no self-injury during hospital stay  Description: INTERVENTIONS:  1.  Ensure constant observer at bedside with Q15M safety checks  2.  Maintain a safe environment  3.  Secure patient belongings  4.  Ensure family/visitors adhere to safety recommendations  5.  Ensure safety tray has been added to patient's diet order  6.  Every shift and PRN: Re-assess suicidal risk via Frequent Screener    7/16/2024 2138 by Mari Bates LPN  Outcome: Progressing     Problem: Depression  Goal: Will be euthymic at discharge  Description: INTERVENTIONS:  1. Administer medication as ordered  2. Provide emotional support via 1:1 interaction with staff  3. Encourage involvement in milieu/groups/activities  4. 
INTERVENTIONS:  1. Administer medication as  ordered  2. Assist with reality testing to support increasing orientation  3. Assess if patient's hallucinations or delusions are encouraging self harm or harm to others and intervene as appropriate  7/19/2024 0154 by Emily Spence RN  Outcome: Progressing     Problem: Behavior  Goal: Pt/Family maintain appropriate behavior and adhere to behavioral management agreement, if implemented  Description: INTERVENTIONS:  1. Assess patient/family's coping skills and  non-compliant behavior (including use of illegal substances)  2. Notify security of behavior or suspected illegal substances which indicate the need for search of the family and/or belongings  3. Encourage verbalization of thoughts and concerns in a socially appropriate manner  4. Utilize positive, consistent limit setting strategies supporting safety of patient, staff and others  5. Encourage participation in the decision making process about the behavioral management agreement  6. If a visitor's behavior poses a threat to safety call refer to organization policy.  7. Initiate consult with , Psychosocial CNS, Spiritual Care as appropriate  7/19/2024 0154 by Emily Spence RN  Outcome: Progressing     Problem: Involuntary Admit  Goal: Will cooperate with staff recommendations and doctor's orders and will demonstrate appropriate behavior  Description: INTERVENTIONS:  1. Treat underlying conditions and offer medication as ordered  2. Educate regarding involuntary admission procedures and rules  3. Contain excessive/inappropriate behavior per unit and hospital policies  7/19/2024 0154 by Emily Spence RN  Outcome: Progressing     Problem: Self Care Deficit  Goal: Return ADL status to a safe level of function  Description: INTERVENTIONS:  1. Administer medication as ordered  2. Assess ADL deficits and provide assistive devices as needed  3. Obtain PT/OT consults as needed  4. Assist and instruct patient 
patient to increase activity and self care as tolerated  Outcome: Progressing     Problem: Skin/Tissue Integrity  Goal: Absence of new skin breakdown  Description: 1.  Monitor for areas of redness and/or skin breakdown  2.  Assess vascular access sites hourly  3.  Every 4-6 hours minimum:  Change oxygen saturation probe site  4.  Every 4-6 hours:  If on nasal continuous positive airway pressure, respiratory therapy assess nares and determine need for appliance change or resting period.  Outcome: Progressing     Problem: Nutrition Deficit:  Goal: Optimize nutritional status  Outcome: Progressing     
determine need for appliance change or resting period.  7/16/2024 2138 by Mari Bates LPN  Outcome: Progressing     Problem: Nutrition Deficit:  Goal: Optimize nutritional status  7/16/2024 2138 by Mari Bates LPN  Outcome: Progressing     Problem: Anxiety  Goal: Will report anxiety at manageable levels  Description: INTERVENTIONS:  1. Administer medication as ordered  2. Teach and rehearse alternative coping skills  3. Provide emotional support with 1:1 interaction with staff  7/16/2024 2138 by Mari Bates LPN  Outcome: Not Progressing  Flowsheets (Taken 7/16/2024 2130)  Will report anxiety at manageable levels:   Administer medication as ordered   Provide emotional support with 1:1 interaction with staff     Problem: Sleep Disturbance  Goal: Will exhibit normal sleeping pattern  Description: INTERVENTIONS:  1. Administer medication as ordered  2. Decrease environmental stimuli, including noise, as appropriate  3. Discourage social isolation and naps during the day  7/16/2024 2138 by Mari Bates LPN  Outcome: Not Progressing

## 2024-07-23 NOTE — BH NOTE
Patient alert and oriented x4, pleasant excited to be going home, compliant with medications, PRN Trazodone 50 mg PO effective for sleep and PRN Xanax 0.5 mg PO effective for anxiety, reports anxiety was 8/10, depression 1/10. Patient visible on unit social with select peers. Denies any thoughts of self harm or homicidal ideation, denies A/V/H, will monitor for safety and offer support as needed.

## 2024-07-23 NOTE — BH NOTE
Spoke with pt's daughter Angela and explained DC plan for today. She stated she does not feel able to care for pt at home and described the way pt was acting at admission. Explained pt has significantly cleared cognitively and is far more functional at this time. Also, explained pt is requesting discharge and does not currently meet criteria for an involuntary hold or placement in a nursing home against her will. At this point we are discharging pt per her request. Angela reports understanding but still disagrees with discharge. She stated she is leaving today and will be out of town for about a week. Pt will be home alone while Angela is gone. Offered to delay discharge 1 day (pending pt's consent) to allow Angela time to make arrangements for a family member to check on pt while Angela is gone. Will talk to pt and call Angela back to inform her whether DC will be today or tomorrow.         13:15 - Met with pt in conjunction with psychiatrist. Pt continues to be able to engage appropriately in conversation. She is able to identify how she financially supports herself and how she gets her needs met at home. She reports knowing her pharmacy will prepackage her medication in bubble packs and plans to talk to them about doing this to help her manage her meds. She also is able to state she typically gets transportation to appointments from family members and identify which time of the day is easiest for her to find transport. She is also able to confirm her out-pt treatment providers and give details about her care with them. She reports she has spoken with her daughter Rita, and Rita will be picking pt up at DC today. Pt does not want to delay DC until tomorrow to allow daughter Angela to make arrangements for pt's support during Angela's absence. Pt reports she has her other daughter (Rita) and her son for support in Angela's absence.       14:30 - Attempted to reach Angela to inform her

## 2024-07-23 NOTE — TRANSITION OF CARE
Behavioral Health Transition Record    Patient Name: Carol Wagoner  YOB: 1965   Medical Record Number: [id number]  Date of Admission: 7/3/2024  8:34 PM   Date of Discharge: 07/23/2024    Attending Provider: Parish Shea MD   Discharging Provider: Parish Shea MD  To contact this individual call [phone] and ask the  to page.  If unavailable, ask to be transferred to Behavioral Health Provider on call.  A Behavioral Health Provider will be available on call 24/7 and during holidays.    Primary Care Provider: Gamaliel Villeda MD    No Known Allergies    Reason for Admission:     Carol Wagoner is 59 year old female who presented in the emergency department with inability to care for herself and an altered state of mind. Patient experiences paranoia and is delusional. Pt also experiences poor judgement and poor insight. Pt attempted suicide by drinking . Patient was also found eating dog warmers and cooking food on the stove without a pan. Pt lives with her daughter and was increasingly putting she and her daughter in danger.    Admission Diagnosis:     Failure to thrive in adult [R62.7]  Dementia, unspecified dementia severity, unspecified dementia type, unspecified whether behavioral, psychotic, or mood disturbance or anxiety (HCC) [F03.90]  Dementia without behavioral disturbance (HCC) [F03.90]  Confusion [R41.0]    The crisis number for [facility] is [phone].  You can use this number at any time to access emergency mental health services.  The National Suicide and Crisis Hotline Number is 988.  You can call, chat, or text this number at any time to access emergency mental health services.      * No surgery found *    Results for orders placed or performed during the hospital encounter of 07/03/24   Urinalysis with Reflex to Culture    Specimen: Urine   Result Value Ref Range    Color, UA Straw Straw/Yellow    Clarity, UA Clear Clear    Glucose,

## 2024-07-24 ENCOUNTER — FOLLOWUP TELEPHONE ENCOUNTER (OUTPATIENT)
Dept: PSYCHIATRY | Age: 59
End: 2024-07-24

## 2024-07-29 ENCOUNTER — TELEPHONE (OUTPATIENT)
Dept: PSYCHIATRY | Age: 59
End: 2024-07-29
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
